# Patient Record
Sex: MALE | Race: WHITE | NOT HISPANIC OR LATINO | Employment: FULL TIME | URBAN - METROPOLITAN AREA
[De-identification: names, ages, dates, MRNs, and addresses within clinical notes are randomized per-mention and may not be internally consistent; named-entity substitution may affect disease eponyms.]

---

## 2022-10-20 ENCOUNTER — OFFICE VISIT (OUTPATIENT)
Dept: URGENT CARE | Facility: CLINIC | Age: 22
End: 2022-10-20
Payer: COMMERCIAL

## 2022-10-20 VITALS
TEMPERATURE: 98.2 F | HEART RATE: 86 BPM | RESPIRATION RATE: 20 BRPM | DIASTOLIC BLOOD PRESSURE: 80 MMHG | HEIGHT: 77 IN | WEIGHT: 256 LBS | SYSTOLIC BLOOD PRESSURE: 132 MMHG | BODY MASS INDEX: 30.23 KG/M2 | OXYGEN SATURATION: 98 %

## 2022-10-20 DIAGNOSIS — R10.13 EPIGASTRIC PAIN: Primary | ICD-10-CM

## 2022-10-20 DIAGNOSIS — R19.5 LOOSE STOOLS: ICD-10-CM

## 2022-10-20 DIAGNOSIS — R11.2 INTRACTABLE VOMITING WITH NAUSEA: ICD-10-CM

## 2022-10-20 PROCEDURE — 99213 OFFICE O/P EST LOW 20 MIN: CPT | Performed by: NURSE PRACTITIONER

## 2022-10-20 RX ORDER — ONDANSETRON 4 MG/1
4 TABLET, FILM COATED ORAL EVERY 8 HOURS PRN
Qty: 20 TABLET | Refills: 0 | Status: SHIPPED | OUTPATIENT
Start: 2022-10-20

## 2022-10-20 RX ORDER — OMEPRAZOLE 40 MG/1
40 CAPSULE, DELAYED RELEASE ORAL DAILY
Qty: 15 CAPSULE | Refills: 0 | Status: SHIPPED | OUTPATIENT
Start: 2022-10-20 | End: 2022-11-04

## 2022-10-20 NOTE — PATIENT INSTRUCTIONS
You may have H pylori which is a stomach bacteria  You are to take the zofran for nausea and vomiting  You are to take the omeprazole daily 1 hour in the AM prior to eating x 3 weeks    You must follow up with your PCP on 11/3 you need further testing   You are to go to the ED if symptoms worsen

## 2022-10-21 ENCOUNTER — TELEPHONE (OUTPATIENT)
Dept: URGENT CARE | Facility: CLINIC | Age: 22
End: 2022-10-21

## 2022-10-21 NOTE — TELEPHONE ENCOUNTER
220pm 10/21/2022  Pt called and asked to speak with NP as he had a few questions  NP with patients to staff relayed message  Pt was concerned as he had worsening abdominal pain, dark stools and green urine - pt was instructed to go to ED for further evaluation - this was relayed to pt by a staff member

## 2022-11-17 ENCOUNTER — OFFICE VISIT (OUTPATIENT)
Dept: FAMILY MEDICINE CLINIC | Facility: CLINIC | Age: 22
End: 2022-11-17

## 2022-11-17 ENCOUNTER — CONSULT (OUTPATIENT)
Dept: GASTROENTEROLOGY | Facility: CLINIC | Age: 22
End: 2022-11-17

## 2022-11-17 ENCOUNTER — APPOINTMENT (OUTPATIENT)
Dept: LAB | Facility: CLINIC | Age: 22
End: 2022-11-17

## 2022-11-17 VITALS
WEIGHT: 254 LBS | BODY MASS INDEX: 29.99 KG/M2 | TEMPERATURE: 98 F | SYSTOLIC BLOOD PRESSURE: 136 MMHG | OXYGEN SATURATION: 98 % | DIASTOLIC BLOOD PRESSURE: 86 MMHG | HEIGHT: 77 IN | HEART RATE: 80 BPM

## 2022-11-17 VITALS
BODY MASS INDEX: 30.04 KG/M2 | TEMPERATURE: 98 F | WEIGHT: 254.4 LBS | HEIGHT: 77 IN | SYSTOLIC BLOOD PRESSURE: 136 MMHG | DIASTOLIC BLOOD PRESSURE: 86 MMHG | HEART RATE: 80 BPM | OXYGEN SATURATION: 98 %

## 2022-11-17 DIAGNOSIS — R19.5 LOOSE STOOLS: ICD-10-CM

## 2022-11-17 DIAGNOSIS — K29.00 ACUTE GASTRITIS WITHOUT HEMORRHAGE, UNSPECIFIED GASTRITIS TYPE: Primary | ICD-10-CM

## 2022-11-17 DIAGNOSIS — R19.4 CHANGE IN BOWEL HABITS: ICD-10-CM

## 2022-11-17 DIAGNOSIS — R11.2 NAUSEA AND VOMITING, UNSPECIFIED VOMITING TYPE: ICD-10-CM

## 2022-11-17 DIAGNOSIS — R11.2 INTRACTABLE VOMITING WITH NAUSEA: ICD-10-CM

## 2022-11-17 DIAGNOSIS — R10.13 EPIGASTRIC PAIN: ICD-10-CM

## 2022-11-17 DIAGNOSIS — R11.2 NAUSEA AND VOMITING, UNSPECIFIED VOMITING TYPE: Primary | ICD-10-CM

## 2022-11-17 LAB
ALBUMIN SERPL BCP-MCNC: 4.1 G/DL (ref 3.5–5)
ALP SERPL-CCNC: 79 U/L (ref 46–116)
ALT SERPL W P-5'-P-CCNC: 41 U/L (ref 12–78)
ANION GAP SERPL CALCULATED.3IONS-SCNC: 3 MMOL/L (ref 4–13)
AST SERPL W P-5'-P-CCNC: 26 U/L (ref 5–45)
BASOPHILS # BLD AUTO: 0.08 THOUSANDS/ÂΜL (ref 0–0.1)
BASOPHILS NFR BLD AUTO: 1 % (ref 0–1)
BILIRUB SERPL-MCNC: 0.47 MG/DL (ref 0.2–1)
BUN SERPL-MCNC: 14 MG/DL (ref 5–25)
CALCIUM SERPL-MCNC: 9.6 MG/DL (ref 8.3–10.1)
CHLORIDE SERPL-SCNC: 106 MMOL/L (ref 96–108)
CO2 SERPL-SCNC: 27 MMOL/L (ref 21–32)
CREAT SERPL-MCNC: 0.93 MG/DL (ref 0.6–1.3)
CRP SERPL QL: <3 MG/L
EOSINOPHIL # BLD AUTO: 0.13 THOUSAND/ÂΜL (ref 0–0.61)
EOSINOPHIL NFR BLD AUTO: 2 % (ref 0–6)
ERYTHROCYTE [DISTWIDTH] IN BLOOD BY AUTOMATED COUNT: 13.5 % (ref 11.6–15.1)
GFR SERPL CREATININE-BSD FRML MDRD: 117 ML/MIN/1.73SQ M
GLUCOSE P FAST SERPL-MCNC: 106 MG/DL (ref 65–99)
HCT VFR BLD AUTO: 46.8 % (ref 36.5–49.3)
HGB BLD-MCNC: 14.8 G/DL (ref 12–17)
IGA SERPL-MCNC: 190 MG/DL (ref 70–400)
IMM GRANULOCYTES # BLD AUTO: 0.03 THOUSAND/UL (ref 0–0.2)
IMM GRANULOCYTES NFR BLD AUTO: 0 % (ref 0–2)
LIPASE SERPL-CCNC: 44 U/L (ref 73–393)
LYMPHOCYTES # BLD AUTO: 2.3 THOUSANDS/ÂΜL (ref 0.6–4.47)
LYMPHOCYTES NFR BLD AUTO: 31 % (ref 14–44)
MCH RBC QN AUTO: 26.9 PG (ref 26.8–34.3)
MCHC RBC AUTO-ENTMCNC: 31.6 G/DL (ref 31.4–37.4)
MCV RBC AUTO: 85 FL (ref 82–98)
MONOCYTES # BLD AUTO: 0.64 THOUSAND/ÂΜL (ref 0.17–1.22)
MONOCYTES NFR BLD AUTO: 9 % (ref 4–12)
NEUTROPHILS # BLD AUTO: 4.15 THOUSANDS/ÂΜL (ref 1.85–7.62)
NEUTS SEG NFR BLD AUTO: 57 % (ref 43–75)
NRBC BLD AUTO-RTO: 0 /100 WBCS
PLATELET # BLD AUTO: 255 THOUSANDS/UL (ref 149–390)
PMV BLD AUTO: 11.8 FL (ref 8.9–12.7)
POTASSIUM SERPL-SCNC: 4.2 MMOL/L (ref 3.5–5.3)
PROT SERPL-MCNC: 8 G/DL (ref 6.4–8.4)
RBC # BLD AUTO: 5.5 MILLION/UL (ref 3.88–5.62)
SODIUM SERPL-SCNC: 136 MMOL/L (ref 135–147)
TSH SERPL DL<=0.05 MIU/L-ACNC: 1.1 UIU/ML (ref 0.45–4.5)
WBC # BLD AUTO: 7.33 THOUSAND/UL (ref 4.31–10.16)

## 2022-11-17 RX ORDER — FAMOTIDINE 20 MG/1
20 TABLET, FILM COATED ORAL 2 TIMES DAILY
Qty: 90 TABLET | Refills: 3 | Status: SHIPPED | OUTPATIENT
Start: 2022-11-17

## 2022-11-17 RX ORDER — OMEPRAZOLE 40 MG/1
40 CAPSULE, DELAYED RELEASE ORAL DAILY
Qty: 90 CAPSULE | Refills: 3 | Status: SHIPPED | OUTPATIENT
Start: 2022-11-17 | End: 2022-11-22

## 2022-11-17 RX ORDER — ONDANSETRON 4 MG/1
4 TABLET, FILM COATED ORAL EVERY 8 HOURS PRN
Qty: 20 TABLET | Refills: 0 | Status: SHIPPED | OUTPATIENT
Start: 2022-11-17

## 2022-11-17 NOTE — PROGRESS NOTES
[facility]        NAME: Qi Tavares is a 23 y o  male  : 2000    MRN: [id number]  DATE: 2022  TIME: 2:16 PM    Assessment and Plan   Acute gastritis without hemorrhage, unspecified gastritis type [K29 00]  1  Acute gastritis without hemorrhage, unspecified gastritis type  omeprazole (PriLOSEC) 40 MG capsule    famotidine (PEPCID) 20 mg tablet    Ambulatory Referral to Gastroenterology      2  Intractable vomiting with nausea  omeprazole (PriLOSEC) 40 MG capsule    famotidine (PEPCID) 20 mg tablet    Ambulatory Referral to Gastroenterology      3  Loose stools  omeprazole (PriLOSEC) 40 MG capsule    famotidine (PEPCID) 20 mg tablet    Stool culture    Clostridium difficile toxin by PCR    Ambulatory Referral to Gastroenterology      4  Epigastric pain  omeprazole (PriLOSEC) 40 MG capsule    famotidine (PEPCID) 20 mg tablet    Stool culture    Clostridium difficile toxin by PCR    Ambulatory Referral to Gastroenterology        1  Intractable vomiting with nausea  Stopped Omeprazole and Sulcrafate, currently not taking any medication as ordered by GI  Agreeable to restart Omeprazole and add Pepcid  EGD revealed gastritis, no evidence of H  Pylori  Stop vaping, watch diet, avoid aggravating foods/drinks  Please follow up with GI for further management  - omeprazole (PriLOSEC) 40 MG capsule; Take 1 capsule (40 mg total) by mouth daily  Dispense: 90 capsule; Refill: 3  - famotidine (PEPCID) 20 mg tablet; Take 1 tablet (20 mg total) by mouth 2 (two) times a day  Dispense: 90 tablet; Refill: 3  - Ambulatory Referral to Gastroenterology; Future    2  Loose stools  Sulcrafate was stopped due to a potential allergic reaction  Will check stool cultures  - omeprazole (PriLOSEC) 40 MG capsule; Take 1 capsule (40 mg total) by mouth daily  Dispense: 90 capsule; Refill: 3  - famotidine (PEPCID) 20 mg tablet;  Take 1 tablet (20 mg total) by mouth 2 (two) times a day  Dispense: 90 tablet; Refill: 3  - Stool culture; Future  - Clostridium difficile toxin by PCR; Future  - Ambulatory Referral to Gastroenterology; Future    3  Epigastric pain  Stopped Omeprazole and Sulcrafate, currently not taking any medication as ordered by GI  Agreeable to restart Omeprazole and add Pepcid  EGD revealed gastritis, no evidence of H  Pylori  Stop vaping, watch diet, avoid aggravating foods/drinks  Please follow up with GI for further management  - omeprazole (PriLOSEC) 40 MG capsule; Take 1 capsule (40 mg total) by mouth daily  Dispense: 90 capsule; Refill: 3  - famotidine (PEPCID) 20 mg tablet; Take 1 tablet (20 mg total) by mouth 2 (two) times a day  Dispense: 90 tablet; Refill: 3  - Stool culture; Future  - Clostridium difficile toxin by PCR; Future  - Ambulatory Referral to Gastroenterology; Future    4  Acute gastritis without hemorrhage, unspecified gastritis type  Stopped Omeprazole and Sulcrafate, currently not taking any medication as ordered by GI  Agreeable to restart Omeprazole and add Pepcid  EGD revealed gastritis, no evidence of H  Pylori  Stop vaping, watch diet, avoid aggravating foods/drinks  Please follow up with GI for further management  - omeprazole (PriLOSEC) 40 MG capsule; Take 1 capsule (40 mg total) by mouth daily  Dispense: 90 capsule; Refill: 3  - famotidine (PEPCID) 20 mg tablet; Take 1 tablet (20 mg total) by mouth 2 (two) times a day  Dispense: 90 tablet; Refill: 3  - Ambulatory Referral to Gastroenterology; Future    BMI Counseling: Body mass index is 30 12 kg/m²  The BMI is above normal  Nutrition recommendations include encouraging healthy choices of fruits and vegetables, limiting drinks that contain sugar, moderation in carbohydrate intake, reducing intake of saturated and trans fat and reducing intake of cholesterol  Exercise recommendations include exercising 3-5 times per week  Rationale for BMI follow-up plan is due to patient being overweight or obese       Depression Screening and Follow-up Plan: Patient was screened for depression during today's encounter  They screened negative with a PHQ-2 score of 0  Patient Instructions     Patient Instructions     Please restart Omeprazole and begin Pepcid  Complete stool cultures as discussed  Call and schedule appointment with GI for further evaluation  Avoid triggering foods/drinks, and work on smoking cessation (vaping)    Wellness Visit for Adults   AMBULATORY CARE:   A wellness visit  is when you see your healthcare provider to get screened for health problems  Your healthcare provider will also give you advice on how to stay healthy  Write down your questions so you remember to ask them  Ask your healthcare provider how often you should have a wellness visit  What happens at a wellness visit:  Your healthcare provider will ask about your health, and your family history of health problems  This includes high blood pressure, heart disease, and cancer  He or she will ask if you have symptoms that concern you, if you smoke, and about your mood  You may also be asked about your intake of medicines, supplements, food, and alcohol  Any of the following may be done:  · Your weight  will be checked  Your height may also be checked so your body mass index (BMI) can be calculated  Your BMI shows if you are at a healthy weight  · Your blood pressure  and heart rate will be checked  Your temperature may also be checked  · Blood and urine tests  may be done  Blood tests may be done to check your cholesterol levels  Abnormal cholesterol levels increase your risk for heart disease and stroke  You may also need a blood or urine test to check for diabetes if you are at increased risk  Urine tests may be done to look for signs of an infection or kidney disease  · A physical exam  includes checking your heartbeat and lungs with a stethoscope  Your healthcare provider may also check your skin to look for sun damage      · Screening tests may be recommended  A screening test is done to check for diseases that may not cause symptoms  The screening tests you may need depend on your age, gender, family history, and lifestyle habits  For example, colorectal screening may be recommended if you are 48years old or older  Screening tests you need if you are a woman:   · A Pap smear  is used to screen for cervical cancer  Pap smears are usually done every 3 to 5 years depending on your age  You may need them more often if you have had abnormal Pap smear test results in the past  Ask your healthcare provider how often you should have a Pap smear  · A mammogram  is an x-ray of your breasts to screen for breast cancer  Experts recommend mammograms every 2 years starting at age 48 years  You may need a mammogram at age 52 years or younger if you have an increased risk for breast cancer  Talk to your healthcare provider about when you should start having mammograms and how often you need them  Vaccines you may need:   · Get an influenza vaccine  every year  The influenza vaccine protects you from the flu  Several types of viruses cause the flu  The viruses change over time, so new vaccines are made each year  · Get a tetanus-diphtheria (Td) booster vaccine  every 10 years  This vaccine protects you against tetanus and diphtheria  Tetanus is a severe infection that may cause painful muscle spasms and lockjaw  Diphtheria is a severe bacterial infection that causes a thick covering in the back of your mouth and throat  · Get a human papillomavirus (HPV) vaccine  if you are female and aged 23 to 32 or male 23 to 24 and never received it  This vaccine protects you from HPV infection  HPV is the most common infection spread by sexual contact  HPV may also cause vaginal, penile, and anal cancers  · Get a pneumococcal vaccine  if you are aged 72 years or older  The pneumococcal vaccine is an injection given to protect you from pneumococcal disease  Pneumococcal disease is an infection caused by pneumococcal bacteria  The infection may cause pneumonia, meningitis, or an ear infection  · Get a shingles vaccine  if you are 60 or older, even if you have had shingles before  The shingles vaccine is an injection to protect you from the varicella-zoster virus  This is the same virus that causes chickenpox  Shingles is a painful rash that develops in people who had chickenpox or have been exposed to the virus  How to eat healthy:  My Plate is a model for planning healthy meals  It shows the types and amounts of foods that should go on your plate  Fruits and vegetables make up about half of your plate, and grains and protein make up the other half  A serving of dairy is included on the side of your plate  The amount of calories and serving sizes you need depends on your age, gender, weight, and height  Examples of healthy foods are listed below:  · Eat a variety of vegetables  such as dark green, red, and orange vegetables  You can also include canned vegetables low in sodium (salt) and frozen vegetables without added butter or sauces  · Eat a variety of fresh fruits , canned fruit in 100% juice, frozen fruit, and dried fruit  · Include whole grains  At least half of the grains you eat should be whole grains  Examples include whole-wheat bread, wheat pasta, brown rice, and whole-grain cereals such as oatmeal     · Eat a variety of protein foods such as seafood (fish and shellfish), lean meat, and poultry without skin (turkey and chicken)  Examples of lean meats include pork leg, shoulder, or tenderloin, and beef round, sirloin, tenderloin, and extra lean ground beef  Other protein foods include eggs and egg substitutes, beans, peas, soy products, nuts, and seeds  · Choose low-fat dairy products such as skim or 1% milk or low-fat yogurt, cheese, and cottage cheese  · Limit unhealthy fats  such as butter, hard margarine, and shortening         Exercise: Exercise at least 30 minutes per day on most days of the week  Some examples of exercise include walking, biking, dancing, and swimming  You can also fit in more physical activity by taking the stairs instead of the elevator or parking farther away from stores  Include muscle strengthening activities 2 days each week  Regular exercise provides many health benefits  It helps you manage your weight, and decreases your risk for type 2 diabetes, heart disease, stroke, and high blood pressure  Exercise can also help improve your mood  Ask your healthcare provider about the best exercise plan for you  General health and safety guidelines:   · Do not smoke  Nicotine and other chemicals in cigarettes and cigars can cause lung damage  Ask your healthcare provider for information if you currently smoke and need help to quit  E-cigarettes or smokeless tobacco still contain nicotine  Talk to your healthcare provider before you use these products  · Limit alcohol  A drink of alcohol is 12 ounces of beer, 5 ounces of wine, or 1½ ounces of liquor  · Lose weight, if needed  Being overweight increases your risk of certain health conditions  These include heart disease, high blood pressure, type 2 diabetes, and certain types of cancer  · Protect your skin  Do not sunbathe or use tanning beds  Use sunscreen with a SPF 15 or higher  Apply sunscreen at least 15 minutes before you go outside  Reapply sunscreen every 2 hours  Wear protective clothing, hats, and sunglasses when you are outside  · Drive safely  Always wear your seatbelt  Make sure everyone in your car wears a seatbelt  A seatbelt can save your life if you are in an accident  Do not use your cell phone when you are driving  This could distract you and cause an accident  Pull over if you need to make a call or send a text message  · Practice safe sex  Use latex condoms if are sexually active and have more than one partner   Your healthcare provider may recommend screening tests for sexually transmitted infections (STIs)  · Wear helmets, lifejackets, and protective gear  Always wear a helmet when you ride a bike or motorcycle, go skiing, or play sports that could cause a head injury  Wear protective equipment when you play sports  Wear a lifejacket when you are on a boat or doing water sports  © Copyright [employer] 2022 Information is for End User's use only and may not be sold, redistributed or otherwise used for commercial purposes  All illustrations and images included in CareNotes® are the copyrighted property of A D A M , Inc  or [employer]   The above information is an  only  It is not intended as medical advice for individual conditions or treatments  Talk to your doctor, nurse or pharmacist before following any medical regimen to see if it is safe and effective for you  Chief Complaint     Chief Complaint   Patient presents with   • Establish Care   • Annual Exam   • GI Problem     Patient complaints of stomach issues  Going on about a month  Has lost 5 lbs  HIDA scan next week  Has been at ER twice  Has been going to [facility]  Had endoscopy 11/3 and biopsy  History of Present Illness       Here to establish care and discuss abdominal pain/diarrhea/vomitting  Moved form [facility] - was seen regularly by PCP there  Recently having GI concerns - changed his diet, stopped eating late at night, started eating blander foods, rice, chicken, does not drink soda or alcohol  Previously was eating fatty unhealthy foods  Does not take an supplements  Did have EGD completed with biospy which was resulted as "DIAGNOSIS :  A  GASTRIC ANTRUM/BODY (BIOPSIES):  Nonspecific patchy mild chronic gastritis  Histologic features of H  pylori gastritis are not identified "    Reviewed blood work from 10/2022- no significant findings  Discussed results with patient   He ran out of his omeprazole, we will reorder today  Discussed avoiding acidic food, lobito vogel  Was given Sulcrafate - took twice and had facial flushing - called GI doctor, and they told him to stop, did not give him any alternative medication  Pt reports he is still vomiting, threw up his breakfast this morning, having loose dark stools, this morning noticed some redness in his stool, 2-3 times / day, liquid, severe abdominal pain intermittently (pointed to epigastric region)  Refilled Omeprazole, started Pepcid  He did report that GI discussed a colonoscopy and stool samples but he has not heard anymore from them  He would like a second GI opinion  This has been ongoing for 1 month  Wilmer teeth removed in February, broken left ring finger fixed in February  Otherwise, no major problems  Using medical cannabis  Review of Systems   Review of Systems   Constitutional: Negative for activity change, appetite change, chills, fatigue and fever  HENT: Negative for congestion, ear pain, nosebleeds, rhinorrhea and sore throat  Eyes: Negative for photophobia, pain, redness and visual disturbance  Respiratory: Negative for cough, shortness of breath and wheezing  Cardiovascular: Negative  Negative for chest pain  Gastrointestinal: Positive for abdominal pain, diarrhea, nausea and vomiting  Negative for constipation  Endocrine: Negative  Genitourinary: Negative for difficulty urinating, dysuria and flank pain  Musculoskeletal: Negative  Skin: Negative for color change and rash  Neurological: Negative for dizziness, weakness, numbness and headaches  Hematological: Negative for adenopathy  Psychiatric/Behavioral: Negative for agitation and confusion  The patient is not nervous/anxious          PHQ-2/9 Depression Screening    Little interest or pleasure in doing things: 0 - not at all  Feeling down, depressed, or hopeless: 0 - not at all  PHQ-2 Score: 0  PHQ-2 Interpretation: Negative depression screen        Current Medications       Current Outpatient Medications:   •  famotidine (PEPCID) 20 mg tablet, Take 1 tablet (20 mg total) by mouth 2 (two) times a day, Disp: 90 tablet, Rfl: 3  •  omeprazole (PriLOSEC) 40 MG capsule, Take 1 capsule (40 mg total) by mouth daily, Disp: 90 capsule, Rfl: 3  •  ondansetron (ZOFRAN) 4 mg tablet, Take 1 tablet (4 mg total) by mouth every 8 (eight) hours as needed for nausea or vomiting, Disp: 20 tablet, Rfl: 0    Current Allergies     Allergies as of 11/17/2022   • (No Known Allergies)            The following portions of the patient's history were reviewed and updated as appropriate: allergies, current medications, past family history, past medical history, past social history, past surgical history and problem list      No past medical history on file  No past surgical history on file  No family history on file  Medications have been verified  Objective   /86   Pulse 80   Temp 98 °F (36 7 °C)   Ht 6' 5" (1 956 m)   Wt 115 kg (254 lb)   SpO2 98%   BMI 30 12 kg/m²        Physical Exam     Physical Exam  Constitutional:       General: He is not in acute distress  Appearance: Normal appearance  He is well-developed and well-nourished  He is not ill-appearing  HENT:      Head: Normocephalic and atraumatic  Right Ear: Tympanic membrane, ear canal and external ear normal       Left Ear: Tympanic membrane, ear canal and external ear normal       Nose: Nose normal  No rhinorrhea  Mouth/Throat:      Mouth: Oropharynx is clear and moist and mucous membranes are normal       Pharynx: Uvula midline  Eyes:      General: Lids are normal       Extraocular Movements: EOM normal       Conjunctiva/sclera: Conjunctivae normal       Pupils: Pupils are equal, round, and reactive to light  Cardiovascular:      Rate and Rhythm: Normal rate and regular rhythm  Pulses: Intact distal pulses        Heart sounds: Normal heart sounds, S1 normal and S2 normal  No murmur heard     No friction rub  No gallop  Pulmonary:      Effort: Pulmonary effort is normal  No respiratory distress  Breath sounds: Normal breath sounds  No decreased breath sounds or wheezing  Abdominal:      General: Bowel sounds are normal  There is no distension  Palpations: Abdomen is soft  There is no mass  Tenderness: There is generalized abdominal tenderness and tenderness in the epigastric area  There is no guarding or rebound  Negative signs include Rovsing's sign  Hernia: No hernia is present  Musculoskeletal:         General: No tenderness, deformity or edema  Normal range of motion  Skin:     General: Skin is warm  Findings: No erythema or rash  Neurological:      Mental Status: He is alert and oriented to person, place, and time  Comments: Gross Neuro intact  Psychiatric:         Mood and Affect: Mood and affect normal          Behavior: Behavior normal  Behavior is cooperative  Thought Content:  Thought content normal

## 2022-11-17 NOTE — PROGRESS NOTES
[facility]ULT [facility]AL [facility]    NAME: John Washburn  AGE: 23 y o  SEX: male  : 2000     DATE: 2022     Assessment and Plan:     Problem List Items Addressed This Visit    None      Immunizations and preventive care screenings were discussed with patient today  Appropriate education was printed on patient's after visit summary  Counseling:  · {Annual Physical; Counselin}         No follow-ups on file  Chief Complaint:     Chief Complaint   Patient presents with   • Establish Care   • Annual Exam   • GI Problem     Patient complaints of stomach issues  Going on about a month  Has lost 5 lbs  HIDA scan next week  Has been at ER twice  Has been going to [facility]  Had endoscopy 11/3 and biopsy  History of Present Illness:     Adult Annual Physical   Patient here for a comprehensive physical exam  The patient reports {problems:34609}  Diet and Physical Activity  · Diet/Nutrition: {annual physical; diet:}  · Exercise: {annual physical; exercise:2102}  Depression Screening  PHQ-2/9 Depression Screening    Little interest or pleasure in doing things: 0 - not at all  Feeling down, depressed, or hopeless: 0 - not at all  PHQ-2 Score: 0  PHQ-2 Interpretation: Negative depression screen       General Health  · Sleep: {annual physical; sleep:2102}  · Hearing: {annual physical; hearin}  · Vision: {annual physical; vision:}  · Dental: {annual physical; dental:}   Health  · History of STDs? : {yes/no:30763}  Review of Systems:     Review of Systems   Past Medical History:     No past medical history on file  Past Surgical History:     No past surgical history on file     Social History:     Social History     Socioeconomic History   • Marital status: Single     Spouse name: None   • Number of children: None   • Years of education: None   • Highest education level: None   Occupational History   • None   Tobacco Use   • Smoking status: Never   • Smokeless tobacco: Never   Vaping Use   • Vaping Use: Some days   Substance and Sexual Activity   • Alcohol use: None   • Drug use: None   • Sexual activity: None   Other Topics Concern   • None   Social History Narrative   • None     Social Determinants of Health     Financial Resource Strain: Not on file   Food Insecurity: Not on file   Transportation Needs: Not on file   Physical Activity: Not on file   Stress: Not on file   Social Connections: Not on file   Intimate Partner Violence: Not on file   Housing Stability: Not on file      Family History:     No family history on file  Current Medications:     Current Outpatient Medications   Medication Sig Dispense Refill   • ondansetron (ZOFRAN) 4 mg tablet Take 1 tablet (4 mg total) by mouth every 8 (eight) hours as needed for nausea or vomiting 20 tablet 0   • omeprazole (PriLOSEC) 40 MG capsule Take 1 capsule (40 mg total) by mouth daily for 15 days 15 capsule 0     No current facility-administered medications for this visit        Allergies:     No Known Allergies   Physical Exam:     /86   Pulse 80   Temp 98 °F (36 7 °C)   Ht 6' 5" (1 956 m)   Wt 115 kg (254 lb)   SpO2 98%   BMI 30 12 kg/m²     Physical Exam     CHRISTIAN Oakes   [facility]

## 2022-11-17 NOTE — PROGRESS NOTES
Paula [facility] - Outpatient Consultation  Kaylen Woods 23 y o  male MRN: [id number]  Encounter: [id number]    ASSESSMENT AND PLAN:      1  Nausea and vomiting, unspecified vomiting type  2  Epigastric pain    Patient notes approximately 1 month of nausea, nonbloody emesis, retching, and upper abdominal pain  Per patient his symptoms are progressing in frequency and severity, and workup thus far has been somewhat unrevealing  He shares he had an upper endoscopy and right upper quadrant ultrasound completed, and he is currently following with a local GI group  While we do not have all of his records, pathology from upper endoscopy demonstrates some gastritis  There is no biliary pathology from ultrasound a few weeks prior  He shares that his GI team has ordered a HIDA scan for additional evaluation  Ddx includes gastritis, GERD, functional dyspepsia gastroparesis, biliary dyskinesia, cyclical vomiting/cannabis hyperemesis, other etiology  Recommend continuing on PPI and H2B now  Okay for p r n  use of antiemetics  Recommend anti-reflux diet and lifestyle precautions  Repeat lipase and LFTs now given tenderness on abdominal examination  Given relatively unremarkable upper endoscopy, right upper quadrant ultrasound, and HIDA scan planned, will await results prior to proceeding with additional investigation  If the workup is unremarkable and symptoms persist, would consider gastric emptying study and possibly more advanced imaging in the future  - Moreover, if workup is completely unremarkable and symptoms persist, consider 8-12 weeks cessation of cannabis intake  - CBC and differential; Future  - Comprehensive metabolic panel; Future  - Lipase; Future  - C-reactive protein; Future  - Calprotectin,Fecal; Future  - IgA; Future  - Tissue transglutaminase, IgA; Future  - TSH w/Reflex; Future  - Giardia antigen; Future  - Stool Enteric Bacterial Panel by PCR;  Future  - Ova and parasite examination; Future  - ondansetron (ZOFRAN) 4 mg tablet; Take 1 tablet (4 mg total) by mouth every 8 (eight) hours as needed for nausea or vomiting  Dispense: 20 tablet; Refill: 0    3  Change in bowel habits    Patient complains of change in bowel habits to our urgent loose and dark stool  Uncertain as to whether this represents any true melena given his endorsement of intermittent Pepto-Bismol use as well  Moreover, his upper endoscopy was relatively unremarkable per patient report and his hemoglobin has been stable  Recommend targeted stool studies given acute change in bowel habits  We will repeat hemoglobin at this time to ensure no interval development of anemia  If dark appearing stools persist despite workup as above, would then consider colonoscopy to evaluate for right-sided source of bleeding, though index of suspicion as this as an etiology at this point in time is low  Continue on a bland low residue diet at this time  Avoid antidiarrheals until stool testing returns     - CBC and differential; Future  - Comprehensive metabolic panel; Future  - Lipase; Future  - C-reactive protein; Future  - Calprotectin,Fecal; Future  - IgA; Future  - Tissue transglutaminase, IgA; Future  - TSH w/Reflex; Future  - Giardia antigen; Future  - Stool Enteric Bacterial Panel by PCR; Future  - Ova and parasite examination; Future    Discussed with patient that given he has already established with local gastroenterology group, and they are continuing investigative workup for his symptoms, would recommend he either continues to follow with them or transfer his care to ensure continuity of care and best patient outcome possible  He should also follow-up with his PCP regarding complaints of dysuria  He can certainly call us if he has any other questions or concerns     ______________________________________________________________________    HPI: Patient is a 23 y o  male who presents today for a consultation regarding abdominal pain, nausea, dry heaving  No significant past medical history  Patient is new to our clinic  He presently follows with [facility]  He shares that he developed acute onset of abdominal pain, nausea, nonbloody emesis and dry heaving for approximately the past month  Describes a “pit” in the epigastric region and right side of his abdomen  Notes abdominal pain wakes him up from sleep at night  Endorses symptoms of early satiety, states he has lost 5 lb since onset of symptoms  No dysphagia or odynophagia  Does have some intermittent heartburn and reflux, though not predominant symptom  Is unaware of any obvious precipitant or inciting event, no febrile illness, no recent abx, no new meds or supplements, no change in anxiety or stressors, no significant dietary change, no recent travel  Did move into a new home in 08/2022  He does use cannabis, which he states improves his symptoms  No regular NSAID use or EtOH use  Onset of symptoms, spicy foods, sour foods, salty foods seem to worsen his abdominal discomfort  He is also complaining of lower abdominal cramping and a change in his bowel habits  His bowels used to be solid and dark brown, now for the past month he describes bowel movements as “semi-solid to liquid” stools, 2-3 times daily, which can appear quite dark and almost black in color  He does endorse intermittent Pepto-Bismol use, no iron use  No BRBPR  No nocturnal BMs  Has been taking omeprazole, famotidine, and having a bland diet since onset of symptoms, though symptoms persist   Uses nausea pills as needed  Patient denies any new rashes, diffuse mouth ulcerations, joint erythema or swelling, recurrent eye infections  Patient denies family history of inflammatory bowel disease or colon cancer      10/2022: RUQ US: no gallstones, normal CBD, no pericholecystic fluid   10/2022: Hb 14 8, Hct 44 3, Plt 229, MCV 82, BUN 8, Cr 0 79, ALP 71, t bili 0 6, AST 22, ALT 27, total protein 8 0     Endoscopic history:   EGD: 11/2022: report not available at time of dictation   A  Gastric antrum/body (biopsies):Nonspecific patchy mild chronic gastritis  Histologic features of H  pylori gastritis are not identified  Colon: none     Review of Systems   Constitutional: Positive for appetite change, fatigue and unexpected weight change (5 lb weight loss)  Respiratory: Negative for cough and shortness of breath  Cardiovascular: Negative for chest pain and palpitations  Gastrointestinal: Positive for abdominal pain, diarrhea, nausea and vomiting  Negative for anal bleeding and blood in stool  Genitourinary: Positive for dysuria  Musculoskeletal: Negative for arthralgias, joint swelling and myalgias  Skin: Negative for rash  Neurological: Negative for seizures and syncope  Otherwise Per HPI    Historical Information   History reviewed  No pertinent past medical history  History reviewed  No pertinent surgical history  Social History   Social History     Substance and Sexual Activity   Alcohol Use None     Social History     Substance and Sexual Activity   Drug Use Not on file     Social History     Tobacco Use   Smoking Status Never   Smokeless Tobacco Never     History reviewed  No pertinent family history  Meds/Allergies       Current Outpatient Medications:   •  famotidine (PEPCID) 20 mg tablet  •  omeprazole (PriLOSEC) 40 MG capsule  •  ondansetron (ZOFRAN) 4 mg tablet    No Known Allergies    Objective     Blood pressure 136/86, pulse 80, temperature 98 °F (36 7 °C), temperature source Temporal, height 6' 5" (1 956 m), weight 115 kg (254 lb 6 4 oz), SpO2 98 %  Body mass index is 30 17 kg/m²  Physical Exam  Vitals and nursing note reviewed  Constitutional:       Appearance: Normal appearance  HENT:      Head: Normocephalic and atraumatic  Eyes:      General: No scleral icterus       Conjunctiva/sclera: Conjunctivae normal  Cardiovascular:      Rate and Rhythm: Normal rate  Pulses: Normal pulses  Heart sounds: Normal heart sounds  Pulmonary:      Effort: Pulmonary effort is normal  No respiratory distress  Breath sounds: Normal breath sounds  Abdominal:      General: Bowel sounds are normal  There is no distension  Tenderness: There is abdominal tenderness  There is guarding (active)  There is no rebound  Negative signs include Ortega's sign  Skin:     General: Skin is warm and dry  Coloration: Skin is not jaundiced  Neurological:      General: No focal deficit present  Mental Status: He is alert and oriented to person, place, and time  Psychiatric:         Mood and Affect: Mood normal          Behavior: Behavior normal         Lab Results:   No visits with results within 1 Day(s) from this visit  Latest known visit with results is:   No results found for any previous visit  Radiology Results:   No results found  Jessica Soto PA-C    **Please note:  Dictation voice to text software may have been used in the creation of this record  Occasional wrong word or “sound alike” substitutions may have occurred due to the inherent limitations of voice recognition software  Read the chart carefully and recognize, using context, where substitutions have occurred  **

## 2022-11-17 NOTE — PATIENT INSTRUCTIONS
Given you are having persistent symptoms, we are going to expand upon the workup for acute nausea, abdominal pain, and change in bowel habits  - we are going to check more blood work and stool tests for causes of the above  - continue with the omeprazole 40 mg 1st thing in the morning before breakfast, and start on the famotidine/Pepcid 40 mg in the evening  I think you were prescribed 20 mg tablets to take 2 of these nightly  Okay for as needed Zofran for nausea  - if you do not find an appreciable improvement with this medication regimen call and we can try stopping out the omeprazole for a different member of the drug class  - recommend small frequent meals and a bland diet at this time    - avoid regular use of Pepto-Bismol and avoid antidiarrheals until you get all the stool tests back  - your scheduled for a HIDA scan to evaluate for gallbladder dysfunction, if your symptoms persist despite the above workup in medications, would recommend considering a gastric emptying study, more advanced imaging of the abdomen/pelvis, and considering evaluation with colonoscopy

## 2022-11-17 NOTE — PATIENT INSTRUCTIONS
Please restart Omeprazole and begin Pepcid  Complete stool cultures as discussed  Call and schedule appointment with GI for further evaluation  Avoid triggering foods/drinks, and work on smoking cessation (vaping)    Wellness Visit for Adults   AMBULATORY CARE:   A wellness visit  is when you see your healthcare provider to get screened for health problems  Your healthcare provider will also give you advice on how to stay healthy  Write down your questions so you remember to ask them  Ask your healthcare provider how often you should have a wellness visit  What happens at a wellness visit:  Your healthcare provider will ask about your health, and your family history of health problems  This includes high blood pressure, heart disease, and cancer  He or she will ask if you have symptoms that concern you, if you smoke, and about your mood  You may also be asked about your intake of medicines, supplements, food, and alcohol  Any of the following may be done: Your weight  will be checked  Your height may also be checked so your body mass index (BMI) can be calculated  Your BMI shows if you are at a healthy weight  Your blood pressure  and heart rate will be checked  Your temperature may also be checked  Blood and urine tests  may be done  Blood tests may be done to check your cholesterol levels  Abnormal cholesterol levels increase your risk for heart disease and stroke  You may also need a blood or urine test to check for diabetes if you are at increased risk  Urine tests may be done to look for signs of an infection or kidney disease  A physical exam  includes checking your heartbeat and lungs with a stethoscope  Your healthcare provider may also check your skin to look for sun damage  Screening tests  may be recommended  A screening test is done to check for diseases that may not cause symptoms  The screening tests you may need depend on your age, gender, family history, and lifestyle habits   For example, colorectal screening may be recommended if you are 48years old or older  Screening tests you need if you are a woman:   A Pap smear  is used to screen for cervical cancer  Pap smears are usually done every 3 to 5 years depending on your age  You may need them more often if you have had abnormal Pap smear test results in the past  Ask your healthcare provider how often you should have a Pap smear  A mammogram  is an x-ray of your breasts to screen for breast cancer  Experts recommend mammograms every 2 years starting at age 48 years  You may need a mammogram at age 52 years or younger if you have an increased risk for breast cancer  Talk to your healthcare provider about when you should start having mammograms and how often you need them  Vaccines you may need:   Get an influenza vaccine  every year  The influenza vaccine protects you from the flu  Several types of viruses cause the flu  The viruses change over time, so new vaccines are made each year  Get a tetanus-diphtheria (Td) booster vaccine  every 10 years  This vaccine protects you against tetanus and diphtheria  Tetanus is a severe infection that may cause painful muscle spasms and lockjaw  Diphtheria is a severe bacterial infection that causes a thick covering in the back of your mouth and throat  Get a human papillomavirus (HPV) vaccine  if you are female and aged 23 to 32 or male 23 to 24 and never received it  This vaccine protects you from HPV infection  HPV is the most common infection spread by sexual contact  HPV may also cause vaginal, penile, and anal cancers  Get a pneumococcal vaccine  if you are aged 72 years or older  The pneumococcal vaccine is an injection given to protect you from pneumococcal disease  Pneumococcal disease is an infection caused by pneumococcal bacteria  The infection may cause pneumonia, meningitis, or an ear infection      Get a shingles vaccine  if you are 60 or older, even if you have had shingles before  The shingles vaccine is an injection to protect you from the varicella-zoster virus  This is the same virus that causes chickenpox  Shingles is a painful rash that develops in people who had chickenpox or have been exposed to the virus  How to eat healthy:  My Plate is a model for planning healthy meals  It shows the types and amounts of foods that should go on your plate  Fruits and vegetables make up about half of your plate, and grains and protein make up the other half  A serving of dairy is included on the side of your plate  The amount of calories and serving sizes you need depends on your age, gender, weight, and height  Examples of healthy foods are listed below:  Eat a variety of vegetables  such as dark green, red, and orange vegetables  You can also include canned vegetables low in sodium (salt) and frozen vegetables without added butter or sauces  Eat a variety of fresh fruits , canned fruit in 100% juice, frozen fruit, and dried fruit  Include whole grains  At least half of the grains you eat should be whole grains  Examples include whole-wheat bread, wheat pasta, brown rice, and whole-grain cereals such as oatmeal     Eat a variety of protein foods such as seafood (fish and shellfish), lean meat, and poultry without skin (turkey and chicken)  Examples of lean meats include pork leg, shoulder, or tenderloin, and beef round, sirloin, tenderloin, and extra lean ground beef  Other protein foods include eggs and egg substitutes, beans, peas, soy products, nuts, and seeds  Choose low-fat dairy products such as skim or 1% milk or low-fat yogurt, cheese, and cottage cheese  Limit unhealthy fats  such as butter, hard margarine, and shortening  Exercise:  Exercise at least 30 minutes per day on most days of the week  Some examples of exercise include walking, biking, dancing, and swimming   You can also fit in more physical activity by taking the stairs instead of the elevator or parking farther away from stores  Include muscle strengthening activities 2 days each week  Regular exercise provides many health benefits  It helps you manage your weight, and decreases your risk for type 2 diabetes, heart disease, stroke, and high blood pressure  Exercise can also help improve your mood  Ask your healthcare provider about the best exercise plan for you  General health and safety guidelines:   Do not smoke  Nicotine and other chemicals in cigarettes and cigars can cause lung damage  Ask your healthcare provider for information if you currently smoke and need help to quit  E-cigarettes or smokeless tobacco still contain nicotine  Talk to your healthcare provider before you use these products  Limit alcohol  A drink of alcohol is 12 ounces of beer, 5 ounces of wine, or 1½ ounces of liquor  Lose weight, if needed  Being overweight increases your risk of certain health conditions  These include heart disease, high blood pressure, type 2 diabetes, and certain types of cancer  Protect your skin  Do not sunbathe or use tanning beds  Use sunscreen with a SPF 15 or higher  Apply sunscreen at least 15 minutes before you go outside  Reapply sunscreen every 2 hours  Wear protective clothing, hats, and sunglasses when you are outside  Drive safely  Always wear your seatbelt  Make sure everyone in your car wears a seatbelt  A seatbelt can save your life if you are in an accident  Do not use your cell phone when you are driving  This could distract you and cause an accident  Pull over if you need to make a call or send a text message  Practice safe sex  Use latex condoms if are sexually active and have more than one partner  Your healthcare provider may recommend screening tests for sexually transmitted infections (STIs)  Wear helmets, lifejackets, and protective gear    Always wear a helmet when you ride a bike or motorcycle, go skiing, or play sports that could cause a head injury  Wear protective equipment when you play sports  Wear a lifejacket when you are on a boat or doing water sports  © Copyright [employer] 2022 Information is for End User's use only and may not be sold, redistributed or otherwise used for commercial purposes  All illustrations and images included in CareNotes® are the copyrighted property of [employer]  or [facility] Ted Macias   The above information is an  only  It is not intended as medical advice for individual conditions or treatments  Talk to your doctor, nurse or pharmacist before following any medical regimen to see if it is safe and effective for you

## 2022-11-18 LAB — TTG IGA SER-ACNC: <2 U/ML (ref 0–3)

## 2022-11-21 ENCOUNTER — APPOINTMENT (OUTPATIENT)
Dept: LAB | Facility: CLINIC | Age: 22
End: 2022-11-21

## 2022-11-21 DIAGNOSIS — R11.2 NAUSEA AND VOMITING, UNSPECIFIED VOMITING TYPE: ICD-10-CM

## 2022-11-21 DIAGNOSIS — R19.5 LOOSE STOOLS: ICD-10-CM

## 2022-11-21 DIAGNOSIS — R19.4 CHANGE IN BOWEL HABITS: ICD-10-CM

## 2022-11-21 DIAGNOSIS — R10.13 EPIGASTRIC PAIN: ICD-10-CM

## 2022-11-22 ENCOUNTER — TELEPHONE (OUTPATIENT)
Dept: FAMILY MEDICINE CLINIC | Facility: CLINIC | Age: 22
End: 2022-11-22

## 2022-11-22 LAB
CAMPYLOBACTER DNA SPEC NAA+PROBE: NORMAL
G LAMBLIA AG STL QL IA: NEGATIVE
SALMONELLA DNA SPEC QL NAA+PROBE: NORMAL
SHIGA TOXIN STX GENE SPEC NAA+PROBE: NORMAL
SHIGELLA DNA SPEC QL NAA+PROBE: NORMAL

## 2022-11-22 NOTE — TELEPHONE ENCOUNTER
Pt called and was in last week for a visit   He is asking if [employer] would be able to fill out work papers, since he is still off of work    Please advise    Phone [phone]

## 2022-11-23 ENCOUNTER — TELEPHONE (OUTPATIENT)
Dept: OTHER | Facility: OTHER | Age: 22
End: 2022-11-23

## 2022-11-23 LAB — CALPROTECTIN STL-MCNT: 33 UG/G (ref 0–120)

## 2022-11-23 NOTE — TELEPHONE ENCOUNTER
Patient is calling today to Follow up on  Accident and Sickness Paperwork for his Employer  Please call patient to discuss

## 2022-11-25 ENCOUNTER — TELEPHONE (OUTPATIENT)
Dept: FAMILY MEDICINE CLINIC | Facility: CLINIC | Age: 22
End: 2022-11-25

## 2022-11-25 NOTE — TELEPHONE ENCOUNTER
Radha Pinto 14 minutes ago (11:40 AM)     [employer] leave of absents form  Scanned in media  Placed in Jasson folder     Please advise               Note

## 2022-11-25 NOTE — TELEPHONE ENCOUNTER
[employer] leave of absents form  Scanned in [employer] in Jillian Crick folder    Please advise

## 2022-11-30 ENCOUNTER — TELEPHONE (OUTPATIENT)
Dept: GASTROENTEROLOGY | Facility: CLINIC | Age: 22
End: 2022-11-30

## 2022-11-30 NOTE — TELEPHONE ENCOUNTER
Left msg for patient to call office regarding PPW  Need more info  Is he just wanting off a certain amount of time? Also is following with GI  They may be better suited to complete paperwork  PPW put in provider's folder

## 2022-11-30 NOTE — TELEPHONE ENCOUNTER
Pt's PCP has already started completing ppw per Epic documentation  We will be available should pt's employer need additional information though will defer completion of ppw to primary care team      Additionally, we did see pt in consult for chronic GI symptoms however he was following with local private GI group as well with additional w/u  Would encourage patient to plan to follow up with one group for continuity of care purposes

## 2022-11-30 NOTE — TELEPHONE ENCOUNTER
Spoke with patient in regards to [facility] paperwork, informed him his PCP is already completing this paper work  Patient would like to stay with [facility] for continuation of care  Thank you!

## 2022-12-07 ENCOUNTER — APPOINTMENT (OUTPATIENT)
Dept: LAB | Facility: CLINIC | Age: 22
End: 2022-12-07

## 2022-12-07 DIAGNOSIS — R19.7 DIARRHEA, UNSPECIFIED TYPE: ICD-10-CM

## 2022-12-07 DIAGNOSIS — R11.0 NAUSEA: ICD-10-CM

## 2022-12-07 DIAGNOSIS — K29.00 ACUTE GASTRITIS, PRESENCE OF BLEEDING UNSPECIFIED, UNSPECIFIED GASTRITIS TYPE: ICD-10-CM

## 2022-12-07 LAB
T3 SERPL-MCNC: 1.5 NG/ML (ref 0.6–1.8)
T4 SERPL-MCNC: 10.3 UG/DL (ref 4.7–13.3)
TSH SERPL DL<=0.05 MIU/L-ACNC: 1.04 UIU/ML (ref 0.45–4.5)

## 2022-12-12 NOTE — PROGRESS NOTES
[facility]        NAME: Qi Tavares is a 23 y o  male  : 2000    MRN: [id number]  DATE: 2022  TIME: 3:51 PM    Assessment and Plan   Epigastric pain [R10 13]  1  Epigastric pain  ondansetron (ZOFRAN) 4 mg tablet    omeprazole (PriLOSEC) 40 MG capsule   2  Intractable vomiting with nausea  ondansetron (ZOFRAN) 4 mg tablet    omeprazole (PriLOSEC) 40 MG capsule   3  Loose stools  ondansetron (ZOFRAN) 4 mg tablet    omeprazole (PriLOSEC) 40 MG capsule         Patient Instructions       Follow up with PCP in 3-5 days  Proceed to  ER if symptoms worsen  You may have H pylori which is a stomach bacteria  You are to take the zofran for nausea and vomiting  You are to take the omeprazole daily 1 hour in the AM prior to eating x 3 weeks  You must follow up with your PCP on 11/3 you need further testing   You are to go to the ED if symptoms worsen        Chief Complaint     Chief Complaint   Patient presents with   • Nausea     X 3 weeks          History of Present Illness       This is a 23year old male who states has had epigastric pain with dry heaving and loose stools for the last 3 weeks  He denies any fevers, chills  He states that he does have MM card and has really backed off over the last 3 weeks due to it causing nausea  He states he uses it for anxiety  He denies ETOH use, hx of ulcers  He is unable to relate symptoms to food  He states he went to a local urgent care and was told there was nothing wrong with him  He states he has a new PCP appt 11/3/2022  States he is urinating and eating as well as able to  He states he is aware of hyperemesis cannibus use and had thought about that and cut down using  He states he doesn't think that is what this is  Review of Systems   Review of Systems   Constitutional: Negative  HENT: Negative  Eyes: Negative  Respiratory: Negative  Gastrointestinal: Positive for abdominal pain, diarrhea and nausea  Endocrine: Negative  Genitourinary: Negative  Musculoskeletal: Negative  Skin: Negative  Allergic/Immunologic: Negative  Neurological: Negative  Hematological: Negative  Psychiatric/Behavioral: Negative  Current Medications       Current Outpatient Medications:   •  omeprazole (PriLOSEC) 40 MG capsule, Take 1 capsule (40 mg total) by mouth daily for 15 days, Disp: 15 capsule, Rfl: 0  •  ondansetron (ZOFRAN) 4 mg tablet, Take 1 tablet (4 mg total) by mouth every 8 (eight) hours as needed for nausea or vomiting, Disp: 20 tablet, Rfl: 0    Current Allergies     Allergies as of 10/20/2022   • (No Known Allergies)            The following portions of the patient's history were reviewed and updated as appropriate: allergies, current medications, past family history, past medical history, past social history, past surgical history and problem list      History reviewed  No pertinent past medical history  History reviewed  No pertinent surgical history  History reviewed  No pertinent family history  Medications have been verified  Objective   /80   Pulse 86   Temp 98 2 °F (36 8 °C)   Resp 20   Ht 6' 5" (1 956 m)   Wt 116 kg (256 lb)   SpO2 98%   BMI 30 36 kg/m²   No LMP for male patient  Physical Exam     Physical Exam  Vitals and nursing note reviewed  Constitutional:       General: He is not in acute distress  Appearance: Normal appearance  He is obese  He is not ill-appearing, toxic-appearing or diaphoretic  HENT:      Head: Normocephalic and atraumatic  Right Ear: Tympanic membrane and ear canal normal       Left Ear: Tympanic membrane and ear canal normal       Nose: Nose normal  No congestion or rhinorrhea  Mouth/Throat:      Mouth: Mucous membranes are moist       Pharynx: Oropharynx is clear  No oropharyngeal exudate or posterior oropharyngeal erythema  Eyes:      Extraocular Movements: Extraocular movements intact  Cardiovascular:      Rate and Rhythm: Normal rate and regular rhythm  Pulses: Normal pulses  Heart sounds: Normal heart sounds  Pulmonary:      Effort: Pulmonary effort is normal       Breath sounds: Normal breath sounds  Abdominal:      General: Bowel sounds are normal  There is no distension  Palpations: Abdomen is soft  Tenderness: There is no abdominal tenderness  Musculoskeletal:         General: Normal range of motion  Cervical back: Normal range of motion and neck supple  Skin:     General: Skin is warm and dry  Capillary Refill: Capillary refill takes less than 2 seconds  Neurological:      General: No focal deficit present  Mental Status: He is alert and oriented to person, place, and time  Psychiatric:         Mood and Affect: Mood normal          Behavior: Behavior normal          Thought Content: Thought content normal          Judgment: Judgment normal             offered pt H pylori testing by stool and states would not be able to go while [facility]  Lab has closed  He is instructed to follow up with PCP and GI as may need further testing even endoscopy  associated pain...

## 2023-01-05 ENCOUNTER — TELEPHONE (OUTPATIENT)
Dept: FAMILY MEDICINE CLINIC | Facility: CLINIC | Age: 23
End: 2023-01-05

## 2023-01-05 NOTE — TELEPHONE ENCOUNTER
Patient dropped off forms that need to be filled out with correct dates and would like to return to work Monday 01/09/02023, scanned and put in [employer] folder up [address] [facility]   Can be called when finished [phone]

## 2023-01-09 NOTE — TELEPHONE ENCOUNTER
Pt called back in regards to PPW asking if it was complete yet  advised him it is not  Informed patient PCP is out of office in Monday's now  Pt states he really needs these forms done to return to work  He plans to return day after these are signed, as he cannot return to work until we complete forms

## 2023-01-09 NOTE — TELEPHONE ENCOUNTER
Patient no showed his last 2 visits  I was going to get more information from him at this visit and complete it with him in case there were any questions  I will take a look at it when I am back in the office tomorrow

## 2023-02-17 ENCOUNTER — OFFICE VISIT (OUTPATIENT)
Dept: URGENT CARE | Facility: CLINIC | Age: 23
End: 2023-02-17

## 2023-02-17 VITALS
WEIGHT: 254 LBS | OXYGEN SATURATION: 99 % | RESPIRATION RATE: 20 BRPM | TEMPERATURE: 98.9 F | HEART RATE: 81 BPM | BODY MASS INDEX: 30.12 KG/M2

## 2023-02-17 DIAGNOSIS — G89.29 CHRONIC BILATERAL LOWER ABDOMINAL PAIN: Primary | ICD-10-CM

## 2023-02-17 DIAGNOSIS — R10.31 CHRONIC BILATERAL LOWER ABDOMINAL PAIN: Primary | ICD-10-CM

## 2023-02-17 DIAGNOSIS — R10.32 CHRONIC BILATERAL LOWER ABDOMINAL PAIN: Primary | ICD-10-CM

## 2023-02-17 NOTE — PATIENT INSTRUCTIONS
You are to call your GI doctor for follow up appointment and schedule the test that you need to have done     You are to go to the ED if symptoms worsen  Follow up with your PCP as well

## 2023-02-17 NOTE — PROGRESS NOTES
[facility]        NAME: Jermain Wagner is a 23 y o  male  : 2000    MRN: [id number]  DATE: 2023  TIME: 2:52 PM    Assessment and Plan   Chronic bilateral lower abdominal pain [R10 31, G89 29, R10 32]  1  Chronic bilateral lower abdominal pain              Patient Instructions       Follow up with PCP in 3-5 days  Proceed to  ER if symptoms worsen  You are to call your GI doctor for follow up appointment and schedule the test that you need to have done  You are to go to the ED if symptoms worsen  Follow up with your PCP as well       Chief Complaint     Chief Complaint   Patient presents with   • Abdominal Pain         History of Present Illness       This is a 23year old male who has seen GI and had endoscopy and colonoscopy since seen here in the fall  He states he has stopped using the MM and has been found to have ulcers  He is still being worked up for abdominal pain and vomiting  He states this morning he had attempted to go to work and had developed severe lower abdominal pain and when he had a BM he noted light pink blood when he wiped  Denies knowledge of hemorrhoids  He states he had wiped x 2 after and no further blood  Denies diarrhea - states stool was normal  He states he needs a work note for today  Review of Systems   Review of Systems   Constitutional: Negative  HENT: Negative  Eyes: Negative  Respiratory: Negative  Cardiovascular: Negative  Gastrointestinal: Positive for abdominal pain and blood in stool  Endocrine: Negative  Genitourinary: Negative  Musculoskeletal: Negative  Skin: Negative  Allergic/Immunologic: Negative  Neurological: Negative  Hematological: Negative  Psychiatric/Behavioral: Negative            Current Medications       Current Outpatient Medications:   •  famotidine (PEPCID) 20 mg tablet, Take 1 tablet (20 mg total) by mouth 2 (two) times a day, Disp: 90 tablet, Rfl: 3  •  ondansetron (ZOFRAN) 4 mg tablet, Take 1 tablet (4 mg total) by mouth every 8 (eight) hours as needed for nausea or vomiting, Disp: 20 tablet, Rfl: 0  •  pantoprazole (PROTONIX) 40 mg tablet, Take 1 tablet (40 mg total) by mouth 2 (two) times a day, Disp: 30 tablet, Rfl: 3    Current Allergies     Allergies as of 02/17/2023   • (No Known Allergies)            The following portions of the patient's history were reviewed and updated as appropriate: allergies, current medications, past family history, past medical history, past social history, past surgical history and problem list      History reviewed  No pertinent past medical history  History reviewed  No pertinent surgical history  History reviewed  No pertinent family history  Medications have been verified  Objective   Pulse 81   Temp 98 9 °F (37 2 °C)   Resp 20   Wt 115 kg (254 lb)   SpO2 99%   BMI 30 12 kg/m²   No LMP for male patient  Physical Exam     Physical Exam  Vitals and nursing note reviewed  Constitutional:       General: He is not in acute distress  Appearance: He is well-developed  He is obese  He is not ill-appearing, toxic-appearing or diaphoretic  HENT:      Head: Normocephalic and atraumatic  Mouth/Throat:      Mouth: Mucous membranes are moist    Eyes:      Extraocular Movements: Extraocular movements intact  Cardiovascular:      Rate and Rhythm: Normal rate and regular rhythm  Heart sounds: Normal heart sounds  No murmur heard  Pulmonary:      Effort: Pulmonary effort is normal       Breath sounds: Normal breath sounds  Abdominal:      General: Bowel sounds are normal       Palpations: Abdomen is soft  Tenderness: There is no abdominal tenderness  Skin:     General: Skin is warm and dry  Capillary Refill: Capillary refill takes less than 2 seconds  Neurological:      General: No focal deficit present  Mental Status: He is alert and oriented to person, place, and time     Psychiatric: Mood and Affect: Mood normal          Behavior: Behavior normal

## 2023-02-17 NOTE — LETTER
February 17, 2023     Patient: Mariah Brasher   YOB: 2000   Date of Visit: 2/17/2023       To Whom It May Concern: It is my medical opinion that Mariah Brasher may return to work on 2/20/2023  If you have any questions or concerns, please don't hesitate to call           Sincerely,        CHRISTIAN Aguilera    CC: No Recipients

## 2024-03-18 ENCOUNTER — APPOINTMENT (OUTPATIENT)
Dept: RADIOLOGY | Facility: CLINIC | Age: 24
End: 2024-03-18
Payer: COMMERCIAL

## 2024-03-18 ENCOUNTER — OFFICE VISIT (OUTPATIENT)
Dept: URGENT CARE | Facility: CLINIC | Age: 24
End: 2024-03-18
Payer: COMMERCIAL

## 2024-03-18 VITALS
OXYGEN SATURATION: 98 % | DIASTOLIC BLOOD PRESSURE: 88 MMHG | RESPIRATION RATE: 18 BRPM | SYSTOLIC BLOOD PRESSURE: 143 MMHG | TEMPERATURE: 97.8 F | HEART RATE: 85 BPM

## 2024-03-18 DIAGNOSIS — S97.81XA CRUSH INJURY OF FOOT, RIGHT, INITIAL ENCOUNTER: Primary | ICD-10-CM

## 2024-03-18 DIAGNOSIS — M79.671 RIGHT FOOT PAIN: ICD-10-CM

## 2024-03-18 DIAGNOSIS — S97.81XA CRUSH INJURY OF FOOT, RIGHT, INITIAL ENCOUNTER: ICD-10-CM

## 2024-03-18 PROCEDURE — 73630 X-RAY EXAM OF FOOT: CPT

## 2024-03-18 PROCEDURE — 99213 OFFICE O/P EST LOW 20 MIN: CPT | Performed by: NURSE PRACTITIONER

## 2024-03-18 NOTE — LETTER
March 19, 2024     Patient: Mercy Sandy   YOB: 2000   Date of Visit: 3/18/2024       To Whom It May Concern:    It is my medical opinion that Mercy Sandy may return to work on 3//20/2024 .    If you have any questions or concerns, please don't hesitate to call.         Sincerely,        CHRISTIAN Tang    CC: No Recipients

## 2024-03-18 NOTE — PATIENT INSTRUCTIONS
Your xray was preliminarily read by your provider.  A radiologist will read the xray and you will be notified if it is abnormal.    You are to wear the ace wrap as long as you are up moving around.  Elevate, apply ice. Take tylenol and motrin for pain  Follow up with your PCP in 3-5 days  Go to the ED if symptoms worsen   Follow up with podiatry if symptoms worsen  You have been given a referral if needed

## 2024-03-18 NOTE — PROGRESS NOTES
"  [facility]        NAME: Mercy Sandy is a 23 y.o. male  : 2000    MRN: [id number]  DATE: 2024  TIME: 4:01 PM    Assessment and Plan   Crush injury of foot, right, initial encounter [S97.81XA]  1. Crush injury of foot, right, initial encounter  XR foot 3+ vw right    Ambulatory Referral to Podiatry      2. Right foot pain  Ambulatory Referral to Podiatry            Patient Instructions       Follow up with PCP in 3-5 days.  Proceed to  ER if symptoms worsen.    If tests have been performed at [facility], our office will contact you with results if changes need to be made to the care plan discussed with you at the visit.  You can [facility] full [facility] on [facility].      Your xray was preliminarily read by your provider.  A radiologist will read the xray and you will be notified if it is abnormal.    You are to wear the ace wrap as long as you are up moving around.  Elevate, apply ice. Take tylenol and motrin for pain  Follow up with your PCP in 3-5 days  Go to the ED if symptoms worsen   Follow up with podiatry if symptoms worsen  You have been given a referral if needed       Chief Complaint     Chief Complaint   Patient presents with    Foot Injury     C/o right foot injury related to \"metal block for car engine, I set it on the shelf and it felt through\". Pt reports \"I was wearing steel-toe boots but a piece of the crank hit my foot above the steel toe boot\". Onset of event \"yesterday\". Denies seeking care day of event. Denies fall/head strike/LOC. Denies any OTC medications. Denies anticoags/ASA. +pedal pulse, + capillary refill to right foot         History of Present Illness       This is a 23 year old male who states was working on a metal block car engine and it fell through a shelf hitting the top of his right foot.  He states that he had steel toed boots on and it hit the shoe part. He states he has pain with bearing weight.  States had applied ice, taken tylenol " and motrin.  He states he couldn't get his steel toe boots on today and is wearing crocks with pain.  PMH is listed.         Review of Systems   Review of Systems   Constitutional: Negative.    HENT: Negative.     Eyes: Negative.    Respiratory: Negative.     Cardiovascular: Negative.    Gastrointestinal: Negative.    Endocrine: Negative.    Genitourinary: Negative.    Musculoskeletal:         Right foot pain    Skin: Negative.    Allergic/Immunologic: Negative.    Neurological: Negative.    Hematological: Negative.    Psychiatric/Behavioral: Negative.           Current Medications       Current Outpatient Medications:     famotidine (PEPCID) 20 mg tablet, Take 1 tablet (20 mg total) by mouth 2 (two) times a day, Disp: 90 tablet, Rfl: 3    ondansetron (ZOFRAN) 4 mg tablet, Take 1 tablet (4 mg total) by mouth every 8 (eight) hours as needed for nausea or vomiting, Disp: 20 tablet, Rfl: 0    pantoprazole (PROTONIX) 40 mg tablet, Take 1 tablet (40 mg total) by mouth 2 (two) times a day, Disp: 30 tablet, Rfl: 3    Current Allergies     Allergies as of 03/18/2024 - Reviewed 03/18/2024   Allergen Reaction Noted    Omeprazole Itching 03/18/2024            The following portions of the patient's history were reviewed and updated as appropriate: allergies, current medications, past family history, past medical history, past social history, past surgical history and problem list.     History reviewed. No pertinent past medical history.    History reviewed. No pertinent surgical history.    History reviewed. No pertinent family history.      Medications have been verified.        Objective   /88 (BP Location: Right arm, Patient Position: Sitting)   Pulse 85   Temp 97.8 °F (36.6 °C) (Temporal)   Resp 18   SpO2 98%   No LMP for male patient.       Physical Exam     Physical Exam  Vitals and nursing note reviewed.   Constitutional:       General: He is not in acute distress.     Appearance: Normal appearance. He is  "obese. He is not ill-appearing, toxic-appearing or diaphoretic.   Eyes:      Extraocular Movements: Extraocular movements intact.   Cardiovascular:      Rate and Rhythm: Normal rate.      Pulses: Normal pulses.   Pulmonary:      Effort: Pulmonary effort is normal.   Musculoskeletal:         General: Swelling, tenderness and signs of injury present. No deformity. Normal range of motion.      Cervical back: Normal range of motion.        Feet:    Skin:     General: Skin is warm and dry.      Capillary Refill: Capillary refill takes less than 2 seconds.   Neurological:      General: No focal deficit present.      Mental Status: He is alert and oriented to person, place, and time.   Psychiatric:         Mood and Affect: Mood normal.         Behavior: Behavior normal.         Thought Content: Thought content normal.         Judgment: Judgment normal.       Preliminary reading foot xray  No acute process seen  Waiting on rad read    Orthopedic injury treatment    Date/Time: 3/18/2024 3:55 PM    Performed by: CHRISTIAN Tang  Authorized by: CHRISTIAN Tang    Patient Location:  [facility] Protocol:  Procedure performed by: (Michelle DE ANDA)  Consent: The procedure was performed in an emergent situation. Verbal consent obtained. Written consent obtained.  Risks and benefits: risks, benefits and alternatives were discussed  Consent given by: patient  Time out: Immediately prior to procedure a \"time out\" was called to verify the correct patient, procedure, equipment, support staff and site/side marked as required.  Timeout called at: 3/18/2024 3:55 PM.  Patient understanding: patient states understanding of the procedure being performed  Patient consent: the patient's understanding of the procedure matches consent given  Procedure consent: procedure consent matches procedure scheduled  Relevant documents: relevant documents present and verified  Test results: test results available and properly " labeled  Site marked: the operative site was marked  Radiology Images displayed and confirmed. If images not available, report reviewed: imaging studies available  Required items: required blood products, implants, devices, and special equipment available  Patient identity confirmed: verbally with patient and hospital-assigned identification number    Injury location:  Foot  Location details:  Right foot  Injury type:  Soft tissue  Neurovascular status: Neurovascularly intact    Distal perfusion: normal    Neurological function: normal    Range of motion: normal    Immobilization:  Ace wrap  Neurovascular status: Neurovascularly intact    Distal perfusion: normal    Neurological function: normal    Range of motion: normal    Patient tolerance:  Patient tolerated the procedure well with no immediate complications

## 2024-03-19 ENCOUNTER — TELEPHONE (OUTPATIENT)
Age: 24
End: 2024-03-19

## 2024-03-19 ENCOUNTER — TELEPHONE (OUTPATIENT)
Dept: OBGYN CLINIC | Facility: HOSPITAL | Age: 24
End: 2024-03-19

## 2024-03-19 NOTE — TELEPHONE ENCOUNTER
Hello,  Please advise if the following patient can be forced onto the schedule:    Patient: Adelfo     : 2000    MRN: [id number]    Call back [phone]    Insurance: CBC     Reason for appointment: Ref for ASAP dropped heavy items  on Right foot     Requested doctor and/or location: Any/[place]       Thank you.

## 2024-03-19 NOTE — TELEPHONE ENCOUNTER
Caller: Patient    Doctor/Office:     Call regarding :  returning a call    Call was transferred to: Podiatry

## 2024-03-20 ENCOUNTER — TELEPHONE (OUTPATIENT)
Dept: URGENT CARE | Facility: CLINIC | Age: 24
End: 2024-03-20

## 2024-03-20 NOTE — LETTER
March 20, 2024      Patient: Mercy Sandy   YOB: 2000   Date of Visit: 3/18/2024       To Whom it May Concern:    Mercy Sandy was seen in my clinic on 3/18/2024 He may return to work on 3/21/2024 .    If you have any questions or concerns, please don't hesitate to call.         Sincerely,          CHRISTIAN Tang        CC: No Recipients

## 2024-03-20 NOTE — TELEPHONE ENCOUNTER
"Patient called and states that he has been taking tylenol and motrin for pain along with applying ice, wearing the brace and has done some \"epsom salt soaks\". HE states foot is slightly swollen and painful at times to walk.  Instructed pt not to due the warm soaks. He may do the others.  He states that the podiatrist is \"trying to get him in for April 1st appointment\".   He states he does need another work note he wasn't able to go to work today.    "

## 2024-09-13 ENCOUNTER — TELEPHONE (OUTPATIENT)
Dept: FAMILY MEDICINE CLINIC | Facility: CLINIC | Age: 24
End: 2024-09-13

## 2024-09-30 ENCOUNTER — OFFICE VISIT (OUTPATIENT)
Dept: URGENT CARE | Facility: CLINIC | Age: 24
End: 2024-09-30
Payer: COMMERCIAL

## 2024-09-30 VITALS
DIASTOLIC BLOOD PRESSURE: 87 MMHG | OXYGEN SATURATION: 99 % | RESPIRATION RATE: 20 BRPM | TEMPERATURE: 98.3 F | HEART RATE: 97 BPM | SYSTOLIC BLOOD PRESSURE: 150 MMHG

## 2024-09-30 DIAGNOSIS — J01.90 ACUTE VIRAL SINUSITIS: Primary | ICD-10-CM

## 2024-09-30 DIAGNOSIS — B97.89 ACUTE VIRAL SINUSITIS: Primary | ICD-10-CM

## 2024-09-30 PROCEDURE — G0382 LEV 3 HOSP TYPE B ED VISIT: HCPCS | Performed by: STUDENT IN AN ORGANIZED HEALTH CARE EDUCATION/TRAINING PROGRAM

## 2024-09-30 PROCEDURE — S9083 URGENT CARE CENTER GLOBAL: HCPCS | Performed by: STUDENT IN AN ORGANIZED HEALTH CARE EDUCATION/TRAINING PROGRAM

## 2024-09-30 RX ORDER — BROMPHENIRAMINE MALEATE, PSEUDOEPHEDRINE HYDROCHLORIDE, AND DEXTROMETHORPHAN HYDROBROMIDE 2; 30; 10 MG/5ML; MG/5ML; MG/5ML
5 SYRUP ORAL 4 TIMES DAILY PRN
Qty: 120 ML | Refills: 0 | Status: SHIPPED | OUTPATIENT
Start: 2024-09-30

## 2024-09-30 NOTE — PROGRESS NOTES
[facility]        NAME: Mercy Sandy is a 24 y.o. male  : 2000    MRN: [id number]  DATE: 2024  TIME: 4:11 PM    Assessment and Orders   Acute viral sinusitis [J01.90, B97.89]  1. Acute viral sinusitis  brompheniramine-pseudoephedrine-DM 30-2-10 MG/5ML syrup            Plan and Discussion      Symptoms and exam consistent with viral sinusitis. Will treat symptoms with Bromfed.     Risks and benefits discussed. Patient understands and agrees with the plan.     PATIENT INSTRUCTIONS    AAFP Article from - Clinical Diagnosis of Acute Bacterial Rhinosinusitis  ENMANUEL MA, [facility], [facility], AND WOODY DUGGAN MD, Health Research Board, [employer] in [place], [place], [place]     Acute rhinosinusitis in adults is defined as sinonasal inflammation that lasts less than four weeks and is associated with the sudden onset of symptoms.1 In the 2012 National Health Interview Survey, 12% of respondents reported being diagnosed with rhinosinusitis in the previous 12 months.2 In 2016, there were 8 million [place] ambulatory visits for acute sinusitis.3 Acute bacterial rhinosinusitis develops in only 0.5% to 2% of all upper respiratory tract infections.4  A 2018 [place] review demonstrated that the potential benefit of antibiotics for the treatment of acute rhinosinusitis, diagnosed clinically or confirmed with imaging, is marginal.5 Without antibiotics, rhinosinusitis resolved in 46% of patients after one week and in 64% of patients after 14 days.5 Antibiotics can shorten time to resolution but in only five to 11 more people per 100 compared with placebo or no treatment.        If tests have been performed at [facility], our office will contact you with results if changes need to be made to the care plan discussed with you at the visit.  You can review your full results on [facility].    Follow up with PCP.     If any of the following occur, please report to your nearest ED for  evaluation or call 911.   Difficultly breathing or shortness of breath  Chest pain  Acutely worsening symptoms.         Chief Complaint     Chief Complaint   Patient presents with    Headache    Cold Like Symptoms    Cough         History of Present Illness       Rapid COVID negative.       Headache  Cough  Associated symptoms include ear pain (ear popping), headaches and a sore throat.   URI   This is a new problem. The current episode started in the past 7 days. The problem has been gradually worsening. There has been no fever. Associated symptoms include congestion, coughing, ear pain (ear popping), headaches and a sore throat. Pertinent negatives include no nausea or vomiting.       Review of Systems   Review of Systems   HENT:  Positive for congestion, ear pain (ear popping) and sore throat.    Respiratory:  Positive for cough.    Gastrointestinal:  Negative for nausea and vomiting.   Neurological:  Positive for headaches.         Current Medications       Current Outpatient Medications:     brompheniramine-pseudoephedrine-DM 30-2-10 MG/5ML syrup, Take 5 mL by mouth 4 (four) times a day as needed for allergies, congestion or cough, Disp: 120 mL, Rfl: 0    famotidine (PEPCID) 20 mg tablet, Take 1 tablet (20 mg total) by mouth 2 (two) times a day, Disp: 90 tablet, Rfl: 3    ondansetron (ZOFRAN) 4 mg tablet, Take 1 tablet (4 mg total) by mouth every 8 (eight) hours as needed for nausea or vomiting (Patient not taking: Reported on 9/30/2024), Disp: 20 tablet, Rfl: 0    pantoprazole (PROTONIX) 40 mg tablet, Take 1 tablet (40 mg total) by mouth 2 (two) times a day (Patient not taking: Reported on 9/30/2024), Disp: 30 tablet, Rfl: 3    Current Allergies     Allergies as of 09/30/2024 - Reviewed 09/30/2024   Allergen Reaction Noted    Omeprazole Itching 03/18/2024            The following portions of the patient's history were reviewed and updated as appropriate: allergies, current medications, past family history,  past medical history, past social history, past surgical history and problem list.     Past Medical History:   Diagnosis Date    GERD (gastroesophageal reflux disease)        History reviewed. No pertinent surgical history.    History reviewed. No pertinent family history.      Medications have been verified.        Objective   /87   Pulse 97   Temp 98.3 °F (36.8 °C)   Resp 20   SpO2 99%   No LMP for male patient.       Physical Exam     Physical Exam  Constitutional:       General: He is not in acute distress.     Appearance: He is not ill-appearing or toxic-appearing.   HENT:      Head: Normocephalic and atraumatic.      Right Ear: Tympanic membrane and external ear normal.      Left Ear: Tympanic membrane and external ear normal.      Nose: Congestion present.      Comments: Boggy nasal turbinates     Mouth/Throat:      Pharynx: Posterior oropharyngeal erythema present.      Comments: Cobblestone appearance in posterior pharynx  Cardiovascular:      Rate and Rhythm: Normal rate and regular rhythm.   Pulmonary:      Effort: Pulmonary effort is normal. No respiratory distress.      Breath sounds: No wheezing.   Neurological:      General: No focal deficit present.      Mental Status: He is alert and oriented to person, place, and time.   Psychiatric:         Mood and Affect: Mood normal.         Behavior: Behavior normal.         Thought Content: Thought content normal.         Judgment: Judgment normal.               Danielle Whittington DO

## 2024-09-30 NOTE — LETTER
September 30, 2024     Patient: Mercy Sandy   YOB: 2000   Date of Visit: 9/30/2024       To Whom It May Concern:    It is my medical opinion that Mercy Sandy may return to work on 10/2/2024 .    If you have any questions or concerns, please don't hesitate to call.         Sincerely,        Danielle Whittington,     CC: No Recipients

## 2024-09-30 NOTE — PATIENT INSTRUCTIONS
PATIENT INSTRUCTIONS    AAFP Article from 2020- Clinical Diagnosis of Acute Bacterial Rhinosinusitis  ENMANUEL MA, [facility], [facility], AND WOODY DUGGAN MD, Health Research Board, [employer] in [place], [place], [place]     Acute rhinosinusitis in adults is defined as sinonasal inflammation that lasts less than four weeks and is associated with the sudden onset of symptoms.1 In the 2012 National Health Interview Survey, 12% of respondents reported being diagnosed with rhinosinusitis in the previous 12 months.2 In 2016, there were 8 million [place] ambulatory visits for acute sinusitis.3 Acute bacterial rhinosinusitis develops in only 0.5% to 2% of all upper respiratory tract infections.4  A 2018 Moust[place]ha review demonstrated that the potential benefit of antibiotics for the treatment of acute rhinosinusitis, diagnosed clinically or confirmed with imaging, is marginal.5 Without antibiotics, rhinosinusitis resolved in 46% of patients after one week and in 64% of patients after 14 days.5 Antibiotics can shorten time to resolution but in only five to 11 more people per 100 compared with placebo or no treatment.        If tests have been performed at [facility], our office will contact you with results if changes need to be made to the care plan discussed with you at the visit.  You can review your full results on [facility] MyChart.    Follow up with PCP.     If any of the following occur, please report to your nearest ED for evaluation or call 911.   Difficultly breathing or shortness of breath  Chest pain  Acutely worsening symptoms.

## 2025-01-13 ENCOUNTER — APPOINTMENT (OUTPATIENT)
Dept: RADIOLOGY | Facility: CLINIC | Age: 25
End: 2025-01-13
Payer: COMMERCIAL

## 2025-01-13 ENCOUNTER — OFFICE VISIT (OUTPATIENT)
Dept: URGENT CARE | Facility: CLINIC | Age: 25
End: 2025-01-13
Payer: COMMERCIAL

## 2025-01-13 VITALS
TEMPERATURE: 98 F | WEIGHT: 276 LBS | BODY MASS INDEX: 32.59 KG/M2 | OXYGEN SATURATION: 99 % | DIASTOLIC BLOOD PRESSURE: 80 MMHG | HEART RATE: 76 BPM | RESPIRATION RATE: 18 BRPM | SYSTOLIC BLOOD PRESSURE: 144 MMHG | HEIGHT: 77 IN

## 2025-01-13 DIAGNOSIS — S80.11XA CONTUSION OF RIGHT LOWER LEG, INITIAL ENCOUNTER: ICD-10-CM

## 2025-01-13 DIAGNOSIS — M79.604 ACUTE LEG PAIN, RIGHT: ICD-10-CM
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
## 2025-01-13 DIAGNOSIS — M25.571 ACUTE RIGHT ANKLE PAIN: ICD-10-CM

## 2025-01-13 DIAGNOSIS — S76.011A HIP STRAIN, RIGHT, INITIAL ENCOUNTER: ICD-10-CM

## 2025-01-13 DIAGNOSIS — M76.60 ACHILLES TENDON PAIN: Primary | ICD-10-CM

## 2025-01-13 PROCEDURE — 73610 X-RAY EXAM OF ANKLE: CPT

## 2025-01-13 PROCEDURE — S9083 URGENT CARE CENTER GLOBAL: HCPCS | Performed by: NURSE PRACTITIONER

## 2025-01-13 PROCEDURE — 73590 X-RAY EXAM OF LOWER LEG: CPT

## 2025-01-13 PROCEDURE — G0382 LEV 3 HOSP TYPE B ED VISIT: HCPCS | Performed by: NURSE PRACTITIONER

## 2025-01-13 NOTE — LETTER
January 13, 2025     Patient: Mercy Sandy   YOB: 2000   Date of Visit: 1/13/2025       To Whom It May Concern:    It is my medical opinion that Mercy Sandy He may not return to work until seen and cleared by podiatry.    If you have any questions or concerns, please don't hesitate to call.         Sincerely,        CHRISTIAN Tang    CC: No Recipients

## 2025-01-13 NOTE — PROGRESS NOTES
[facility]        NAME: Mercy Sandy is a 24 y.o. male  : 2000    MRN: [id number]  DATE: 2025  TIME: 4:20 PM    Assessment and Plan   Achilles tendon pain [M76.60]  1. Achilles tendon pain  Ambulatory Referral to Podiatry      2. Contusion of right lower leg, initial encounter        3. Hip strain, right, initial encounter        4. Acute right ankle pain  XR ankle 3+ vw right    Ambulatory Referral to Podiatry      5. Acute leg pain, right  XR tibia fibula 2 vw right      6. Fall, initial encounter  XR tibia fibula 2 vw right    XR ankle 3+ vw right    Ambulatory Referral to Podiatry            Patient Instructions       Follow up with PCP in 3-5 days.  Proceed to  ER if symptoms worsen.    If tests have been performed at [facility], our office will contact you with results if changes need to be made to the care plan discussed with you at the visit.  You can review your full results on [facility] My[facility].    Your xray was preliminarily read by your provider.  A radiologist will read the xray and you will be notified if it is abnormal.    You are to Rest, Ice, compression (ace wrap, splint) elevate  Do not remove the splint until you are instructed to do so.   You are to wear the boot as long as you are up moving around - you do not have to wear to bed  Take tylenol or motrin as needed.  You are to see your PCP   Go to the ED if symptoms worsen.    You have been given a referral for podiatry - call tomorrow for appointment  Your employer may not let you wear the boot to work - discuss with them.              Chief Complaint     Chief Complaint   Patient presents with    Fall     Fell on the snow injuring right achilles area , pain in right lower leg, right hip for 1 day         History of Present Illness       This is a 24 year old male who comes to [facility] with c/o right achilles pain and lateral leg pain after a fall x 1 day.  He states he was walking up the He states he did  twist the ankle and fell on to the leg. He states he has hip pain but did not land on the hip and thinks he only twisted the hip. HE has taken motrin for pain.  He denies ever having surgery or fractures of leg, ankle. Denies having an orthopedist.   He states he is able be weight.  He states he works at [employer] and is concerned he may need to be on light duty.  PMH is listed and reviewed.     Fall        Review of Systems   Review of Systems   Constitutional: Negative.    HENT: Negative.     Eyes: Negative.    Respiratory: Negative.     Cardiovascular: Negative.    Gastrointestinal: Negative.    Endocrine: Negative.    Genitourinary: Negative.    Musculoskeletal:         Right leg pain  Right achilles pain   Right hip pain      Skin: Negative.    Allergic/Immunologic: Negative.    Neurological: Negative.    Hematological: Negative.    Psychiatric/Behavioral: Negative.           Current Medications       Current Outpatient Medications:     brompheniramine-pseudoephedrine-DM 30-2-10 MG/5ML syrup, Take 5 mL by mouth 4 (four) times a day as needed for allergies, congestion or cough (Patient not taking: Reported on 1/13/2025), Disp: 120 mL, Rfl: 0    famotidine (PEPCID) 20 mg tablet, Take 1 tablet (20 mg total) by mouth 2 (two) times a day (Patient not taking: Reported on 1/13/2025), Disp: 90 tablet, Rfl: 3    ondansetron (ZOFRAN) 4 mg tablet, Take 1 tablet (4 mg total) by mouth every 8 (eight) hours as needed for nausea or vomiting (Patient not taking: Reported on 1/13/2025), Disp: 20 tablet, Rfl: 0    pantoprazole (PROTONIX) 40 mg tablet, Take 1 tablet (40 mg total) by mouth 2 (two) times a day (Patient not taking: Reported on 1/13/2025), Disp: 30 tablet, Rfl: 3    Current Allergies     Allergies as of 01/13/2025 - Reviewed 01/13/2025   Allergen Reaction Noted    Omeprazole Itching 03/18/2024            The following portions of the patient's history were reviewed and updated as appropriate: allergies, current  "medications, past family history, past medical history, past social history, past surgical history and problem list.     Past Medical History:   Diagnosis Date    GERD (gastroesophageal reflux disease)        History reviewed. No pertinent surgical history.    History reviewed. No pertinent family history.      Medications have been verified.        Objective   /80   Pulse 76   Temp 98 °F (36.7 °C) (Temporal)   Resp 18   Ht 6' 5\" (1.956 m)   Wt 125 kg (276 lb)   SpO2 99%   BMI 32.73 kg/m²   No LMP for male patient.       Physical Exam     Physical Exam  Vitals and nursing note reviewed.   Constitutional:       General: He is not in acute distress.     Appearance: Normal appearance. He is obese. He is not ill-appearing, toxic-appearing or diaphoretic.   HENT:      Head: Normocephalic and atraumatic.      Nose: Nose normal.      Mouth/Throat:      Mouth: Mucous membranes are moist.   Eyes:      Extraocular Movements: Extraocular movements intact.   Cardiovascular:      Rate and Rhythm: Normal rate.      Pulses: Normal pulses.   Pulmonary:      Effort: Pulmonary effort is normal.   Musculoskeletal:         General: Tenderness and signs of injury present. No swelling or deformity. Normal range of motion.      Cervical back: Normal range of motion.      Comments: Right hip/trochanter area - no bruising, edema, deformity. No rotation of the hip.  No TTP  Right lateral lower leg - TTP at fibula shaft - no edema, bruising or deformity  Muscle strength 5/5.   Right ankle/achilles tendon - no bogginess.  Negative pierre test.   FROM - able to bear weight   + pedal pulse. NVI    Skin:     General: Skin is warm and dry.      Capillary Refill: Capillary refill takes less than 2 seconds.   Neurological:      General: No focal deficit present.      Mental Status: He is alert and oriented to person, place, and time.   Psychiatric:         Mood and Affect: Mood normal.         Behavior: Behavior normal.         " Thought Content: Thought content normal.         Judgment: Judgment normal.           Preliminary reading tib/fib   Negative for acute process  Waiting on rad read      Preliminary reading right ankle  No acute osseous abnormality  Waiting on rad read      Cam boot applied by Monica DE ANDA  Pt tolerated procedure   Educated on not driving with cam boot on right foot

## 2025-01-13 NOTE — PATIENT INSTRUCTIONS
Your xray was preliminarily read by your provider.  A radiologist will read the xray and you will be notified if it is abnormal.    You are to Rest, Ice, compression (ace wrap, splint) elevate  Do not remove the splint until you are instructed to do so.   You are to wear the boot as long as you are up moving around - you do not have to wear to bed  Take tylenol or motrin as needed.  You are to see your PCP   Go to the ED if symptoms worsen.    You have been given a referral for podiatry - call tomorrow for appointment  Your employer may not let you wear the boot to work - discuss with them.

## 2025-01-23 ENCOUNTER — OFFICE VISIT (OUTPATIENT)
Age: 25
End: 2025-01-23
Payer: COMMERCIAL

## 2025-01-23 DIAGNOSIS — M76.60 ACHILLES TENDON PAIN: ICD-10-CM
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
## 2025-01-23 DIAGNOSIS — M25.571 ACUTE RIGHT ANKLE PAIN: ICD-10-CM

## 2025-01-23 PROCEDURE — 99203 OFFICE O/P NEW LOW 30 MIN: CPT

## 2025-01-23 NOTE — ASSESSMENT & PLAN NOTE
Orders:    Ambulatory Referral to Podiatry    XR ankle 3+ vw right; Future    MRI ankle/heel right wo contrast; Future    Ambulatory Referral to Physical Therapy; Future

## 2025-01-23 NOTE — ASSESSMENT & PLAN NOTE
Orders:    Ambulatory Referral to Podiatry    XR ankle 3+ vw right; Future    Ambulatory Referral to Physical Therapy; Future

## 2025-01-23 NOTE — PROGRESS NOTES
Name: Mercy Snady      : 2000      MRN: [id number]  Encounter Provider: Ovidio Kwon DPM  Encounter Date: 2025   [facility]  :  Assessment & Plan  Achilles tendon pain    Orders:    Ambulatory Referral to Podiatry    XR ankle 3+ vw right; Future    MRI ankle/heel right wo contrast; Future    Ambulatory Referral to Physical Therapy; Future    Acute right ankle pain    Orders:    Ambulatory Referral to Podiatry    XR ankle 3+ vw right; Future    Ambulatory Referral to Physical Therapy; Future    Fall, initial encounter    Orders:    Ambulatory Referral to Podiatry    XR ankle 3+ vw right; Future    Painful Achilles Tendonitis Treatment:    Rest, Ice, Compression, Elevate foot.  Anti-inflammatory medications (if you are able to take)  Shoe modifications - Heel Lifts  Orthotics  Home exercises  MRI ordered to r/o partial tear of achilles tendon    If no improvement:  Physical Therapy   Immobilization in boot  MRI    If conservative treatment fails:  Surgery    I personally discussed with patient at the visit today:     The diagnosis of this encounter  2.   Possible etiologies of the diagnosis  3.   Treatment options including advantages and disadvantages of treatment with potential risks and complications associated with these treatments  4.   Prevention strategies and ways to decrease pain     I answered all of the patients questions.    History of Present Illness   HPI  Mercy Sandy is a 24 y.o. 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 He reports pain while working and weightbearing to the back of his heel. He has no other concerns today.     DOI 1/10/2024  History obtained from: patient    Review of Systems  Current Outpatient Medications on File Prior to Visit   Medication Sig Dispense Refill    brompheniramine-pseudoephedrine-DM 30-2-10 MG/5ML syrup Take 5 mL by mouth 4 (four) times a day as needed  for allergies, congestion or cough (Patient not taking: Reported on 1/13/2025) 120 mL 0    famotidine (PEPCID) 20 mg tablet Take 1 tablet (20 mg total) by mouth 2 (two) times a day (Patient not taking: Reported on 1/13/2025) 90 tablet 3    ondansetron (ZOFRAN) 4 mg tablet Take 1 tablet (4 mg total) by mouth every 8 (eight) hours as needed for nausea or vomiting (Patient not taking: Reported on 9/30/2024) 20 tablet 0    pantoprazole (PROTONIX) 40 mg tablet Take 1 tablet (40 mg total) by mouth 2 (two) times a day (Patient not taking: Reported on 1/23/2025) 30 tablet 3     No current facility-administered medications on file prior to visit.         Objective   There were no vitals taken for this visit.     Physical Exam  Vascular: Intact pedal pulses bilateral DP and PT.  Neurological: Gross protective sensation intact bilateral  Musculoskeletal: Muscle strength bilateral intact with dorsiflexion, inversion, eversion and plantarflexion.  Tenderness on palpation at the insertion point of the Achilles tendon.  There is intact muscle strength on plantarflexion.  There is tenderness on maximal dorsiflexion at the level of the ankle at the insertion point of the Achilles tendon.  There is some mild swelling is noted around the Achilles tendon insertion point.  No palpable dell in the Achilles tendon.  Dermatological: No open lesions or ulcerations noted bilateral.

## 2025-01-24 ENCOUNTER — HOSPITAL ENCOUNTER (OUTPATIENT)
Dept: MRI IMAGING | Facility: HOSPITAL | Age: 25
Discharge: HOME | End: 2025-01-24
Payer: COMMERCIAL

## 2025-01-24 ENCOUNTER — TELEPHONE (OUTPATIENT)
Dept: URGENT CARE | Facility: CLINIC | Age: 25
End: 2025-01-24

## 2025-01-24 DIAGNOSIS — M76.60 ACHILLES TENDON PAIN: ICD-10-CM

## 2025-01-24 PROCEDURE — 73721 MRI JNT OF LWR EXTRE W/O DYE: CPT

## 2025-01-24 NOTE — TELEPHONE ENCOUNTER
FMLA forms received today for pt visit on 1/13/25.  [facility] does not complete this type of form.   [facility] at [facility] and referred to [facility]iatry.  Pt is currently following up with podiatry Dr. Kwon.  Informed Past[employer] to reach out to insurance company that sent forms to reach out to podiatry.

## 2025-01-28 ENCOUNTER — OFFICE VISIT (OUTPATIENT)
Age: 25
End: 2025-01-28
Payer: COMMERCIAL

## 2025-01-28 VITALS — WEIGHT: 276 LBS | BODY MASS INDEX: 32.59 KG/M2 | HEIGHT: 77 IN

## 2025-01-28 DIAGNOSIS — M25.571 ACUTE RIGHT ANKLE PAIN: Primary | ICD-10-CM

## 2025-01-28 DIAGNOSIS — M76.60 ACHILLES TENDON PAIN: ICD-10-CM

## 2025-01-28 PROCEDURE — 99213 OFFICE O/P EST LOW 20 MIN: CPT

## 2025-01-28 NOTE — PROGRESS NOTES
Name: Mercy Sandy      : 2000      MRN: [id number]  Encounter Provider: Ovidio Kwon DPM  Encounter Date: 2025   Encounter [facility]  :  Assessment & Plan  Acute right ankle pain         Achilles tendon pain         Painful Achilles Tendonitis Treatment:    Rest, Ice, Compression, Elevate foot.  Anti-inflammatory medications (if you are able to take)  Shoe modifications - Heel Lifts  Patient is currently in Cam boot, okay to transition to supportive sneaker as tolerated  Orthotics  Home exercises  Referral to physical therapy placed  MRI reviewed Achilles tendon intact    If no improvement:  Immobilization in boot  MRI    If conservative treatment fails:  Surgery    I personally discussed with patient at the visit today:     The diagnosis of this encounter  2.   Possible etiologies of the diagnosis  3.   Treatment options including advantages and disadvantages of treatment with potential risks and complications associated with these treatments  4.   Prevention strategies and ways to decrease pain     I answered all of the patients questions.    History of Present Illness   HPI  Mercy Sandy is a 24 y.o. male who presents for follow-up of right ankle MRI.  Patient has been taking it easy and weightbearing as tolerated in cam boot.  He has noticed some improvement in symptoms since he has been off of foot.  Initial injury was early January when he was pushing heavy axles in a lunging motion he reports he is still having some pain in his Achilles tendon      DOI 1/10/2024  History obtained from: patient    Review of Systems  Current Outpatient Medications on File Prior to Visit   Medication Sig Dispense Refill    brompheniramine-pseudoephedrine-DM 30-2-10 MG/5ML syrup Take 5 mL by mouth 4 (four) times a day as needed for allergies, congestion or cough (Patient not taking: Reported on 2025) 120 mL 0    famotidine (PEPCID) 20 mg tablet Take 1  "tablet (20 mg total) by mouth 2 (two) times a day (Patient not taking: Reported on 1/13/2025) 90 tablet 3    ondansetron (ZOFRAN) 4 mg tablet Take 1 tablet (4 mg total) by mouth every 8 (eight) hours as needed for nausea or vomiting (Patient not taking: Reported on 9/30/2024) 20 tablet 0    pantoprazole (PROTONIX) 40 mg tablet Take 1 tablet (40 mg total) by mouth 2 (two) times a day (Patient not taking: Reported on 1/23/2025) 30 tablet 3     No current facility-administered medications on file prior to visit.         Objective   Ht 6' 5\" (1.956 m)   Wt 125 kg (276 lb)   BMI 32.73 kg/m²      Physical Exam  Vascular: Intact pedal pulses bilateral DP and PT.  Neurological: Gross protective sensation intact bilateral  Musculoskeletal: Muscle strength bilateral intact with dorsiflexion, inversion, eversion and plantarflexion.  Tenderness on palpation at the insertion point of the Achilles tendon.  There is intact muscle strength on plantarflexion.  There is tenderness on maximal dorsiflexion at the level of the ankle at the insertion point of the Achilles tendon.  There is some mild swelling is noted around the Achilles tendon insertion point.  No palpable dell in the Achilles tendon.  Dermatological: No open lesions or ulcerations noted bilateral.      "

## 2025-01-30 ENCOUNTER — EVALUATION (OUTPATIENT)
Dept: PHYSICAL THERAPY | Facility: CLINIC | Age: 25
End: 2025-01-30
Payer: COMMERCIAL

## 2025-01-30 DIAGNOSIS — M25.571 ACUTE RIGHT ANKLE PAIN: ICD-10-CM

## 2025-01-30 DIAGNOSIS — M76.60 ACHILLES TENDON PAIN: ICD-10-CM

## 2025-01-30 PROCEDURE — 97110 THERAPEUTIC EXERCISES: CPT | Performed by: PHYSICAL THERAPIST

## 2025-01-30 PROCEDURE — 97161 PT EVAL LOW COMPLEX 20 MIN: CPT | Performed by: PHYSICAL THERAPIST

## 2025-01-30 NOTE — PROGRESS NOTES
PT Evaluation     Today's date: 2025  Patient name: Mercy Sandy  : 2000  MRN: [id number]  Referring provider: Ovidio Kwon DPM  Dx:   Encounter Diagnosis     ICD-10-CM    1. Achilles tendon pain  M76.60 Ambulatory Referral to Physical Therapy      2. Acute right ankle pain  M25.571 Ambulatory Referral to Physical Therapy                     Assessment  Symptom irritability: moderate    Assessment details: Mercy Sandy is a pleasant 24 y.o. presenting to physical therapy with MD referral for Achilles tendon pain and Acute right ankle pain.    Problem list:  Limited ankle ROM, decreased hip/core strength, limited lower extremity flexibility, decreased balance, abnormal squat mechanics, and abnormal gait.    Treatment to include: Manual therapy techniques, ankle ROM, lower extremity/core strengthening, neuromuscular control exercises, balance/proprioception training, gait training as needed, squat retraining, instruction in a comprehensive HEP, and modalities as needed.     This pt would benefit from skilled PT services to address their impairments and functional limitations to maximize functional outcome.     Barriers to therapy: Manual load of job  Understanding of Dx/Px/POC: good     Prognosis: good    Goals  ST. Pt will improve ankle DF ROM at least least 11 degrees in 4 weeks.  2. Pt will be able to squat to at least 60 degrees knee flexion in 4 weeks.    LT. Pt will be able to walk on inclines with minimal to no pain in 8 weeks.  2. Pt will be independent in a comprehensive HEP in 8 weeks  3. 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     Plan  Patient would benefit from: skilled physical therapy    Frequency: 2-3x week  Duration in weeks: 8  Plan of Care beginning date: 2025  Plan of Care expiration date: 3/28/2025  Treatment plan discussed with: patient      Subjective Evaluation    History of Present Illness  Mechanism  "of injury: Pt builds semi trucks for a living. He was pushing an andrew a little too hard in 2024.He thought he overstretched his achilles at that time. He gave himself a few weeks of \"light duty\" to rest.  , pt felt significant pain and he has not returned to work since.  On , pt states he fell when walking up the stairs due to his right achilles tightening and then loosened and then pt was in pain. Pt went to urgent care who placed pt in a CAM boot and took an xray. Pt was referred to podiatry who did an MRI. MRI 25 revealed \"IMPRESSION:  1. 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  2. Unremarkable appearance to the Achilles tendon.\"    Pt continues to wear CAM boot; however, did not wear it today secondary to driving to his appointment.     Premorbid status:  - ADLs: Independent with no difficulty  - Work: Full time, Full duty  - Recreation: occasional walks    Current status:  - ADLs/Functional activities:   - Stairs Step to pattern with Pain Levels: moderate pain   - Sit to stand with mild/moderate pain   - Walking 10 minutes over level surface prior to increase in pain   - Walking uphill with instant increase in pain   - Standing 10 minutes prior to increase in pain   - Sitting unlimited without issue   - Sleeping with 0 nightly sleep disturbances due to pain  - Work: Unable to work secondary to dysfunction  - Recreation: none     Patient Goals  Patient goals for therapy: decreased pain, increased motion, increased strength and return to work    Pain  Current pain ratin  At best pain ratin  At worst pain ratin  Location: pain in achilles and calcaneus  Quality: sharp, pulling and tight  Relieving factors: rest and ice (rubbing tendon)  Aggravating factors: stair climbing, walking and standing  Progression: improved      Diagnostic Tests  X-ray: normal  MRI studies: " "abnormal  Treatments  Previous treatment: medication and immobilization  Current treatment: immobilization and physical therapy      Objective     Observations     Additional Observation Details  Static standing: Pt's weight shifted over LLE    Palpation     Additional Palpation Details  TTP over distal achilles insertion, medial calcaneal tubercle, and plantar fascia on R.    Active Range of Motion   Left Ankle/Foot   Dorsiflexion (ke): 6 degrees   Plantar flexion: 62 degrees   Inversion: 36 degrees   Eversion: 25 degrees     Right Ankle/Foot   Dorsiflexion (ke): 4 degrees   Plantar flexion: 64 degrees   Inversion: 40 degrees   Eversion: 22 degrees     Passive Range of Motion   Left Ankle/Foot    Great toe extension: 40 degrees     Right Ankle/Foot    Dorsiflexion (ke): 8 degrees   Great toe extension: 40 degrees     Strength/Myotome Testing     Left Hip   Planes of Motion   Flexion: 5  External rotation: 4+  Internal rotation: 4+    Right Hip   Planes of Motion   Flexion: 4  External rotation: 4  Internal rotation: 4    Left Knee   Flexion: 5  Extension: 5    Right Knee   Flexion: 4  Extension: 4    Left Ankle/Foot   Dorsiflexion: 5  Plantar flexion: 5    Right Ankle/Foot   Dorsiflexion: 4+  Plantar flexion: 4  Inversion: 5  Eversion: 5    Additional Strength Details  SLS L: 30\"  SLS R: 30\"    Double leg Squat: 2/3 reps with mild lateral shift to the left with increased toe out on R with no assistive device.    Gait: Pt ambulates over level surface with increased toe out  on RLE with sneaker donned at a slow pace.    Single leg heel raise:  L: 25 reps  R: 9 reps prior to increase in knee bend              Precautions: none  CO-MORBIDITIES: hx of finger fracture  HEP ACCESS CODE: [id number]  (Updated: 1-30-25)  FOTO Completed On: 1-30-25    POC Expires Reeval for Medicare to be completed Re-eval last completed on Unit Limit Auth Start Date Auth Expiration Date PT/OT/ST  Visit Limit   3-28-25 By visit  10  unknown NA " "NA 90                         TREATMENT DIARY  Auth Status DATE 1-30 (IE)            NA Visit # 1             Remaining 9            MANUAL THERAPY             IASTM to gastroc/achilles NV            IASTM to plantar fascia NV                                                                THERAPEUTIC EXERCISE             NuStep to improve LE strength/endurance 10 mins, L4 NV                         Standing:             - SB gastroc stretch 4 x 30\" NV            - SB soleus stretch 4 x 30\" NV                         Seated:             - plantar fascia stretch with towel in long sit 4 x 30\" NV            - great toe extension stretch 4 x 30\" NV            - TB ankle 4 way 2 x 10 ea GTB NV                         Table:             - Bridges with TB around knees 20 x 5\" RTB NV            - SL hip ER with TB 20 x 5\" ea RTB NV                                      NEUROMUSCULAR REEDUCATION              Standing:             - BIODEX:                  - LOS NV                 - Maze NV                                                                                                                                              THERAPEUTIC ACTIVITY             Front Step ups 2 x 10 6\" step NV            Lateral step ups 2 x 10 6\" step NV            Mini squats 2 x 10 NV            Heel raises 2 x 10 NV            Toe raises 2 x 10 NV                                                                                                                                                           GAIT TRAINING                                                                              MODALITIES             Cryo as needed                               * On initial evaluation, educated pt on anatomy, pathology, and exercise rationale. Provided pt with basic HEP and ensured proper exercise performance. Educated pt to call with any questions or concerns.  Educated pt he can get a heel insert (showed on [employer], but can be purchased " wherever pt would like) that can peel away a .25 inch at a time to wean out of CAM boot into sneakers.

## 2025-02-03 ENCOUNTER — OFFICE VISIT (OUTPATIENT)
Dept: PHYSICAL THERAPY | Facility: CLINIC | Age: 25
End: 2025-02-03
Payer: COMMERCIAL

## 2025-02-03 DIAGNOSIS — M25.571 ACUTE RIGHT ANKLE PAIN: ICD-10-CM

## 2025-02-03 DIAGNOSIS — M76.60 ACHILLES TENDON PAIN: Primary | ICD-10-CM

## 2025-02-03 PROCEDURE — 97140 MANUAL THERAPY 1/> REGIONS: CPT

## 2025-02-03 PROCEDURE — 97110 THERAPEUTIC EXERCISES: CPT

## 2025-02-03 NOTE — PROGRESS NOTES
"Daily Note     Today's date: 2/3/2025  Patient name: Mercy Sandy  : 2000  MRN: [id number]  Referring provider: Ovidio Kwon DPM  Dx:   Encounter Diagnosis     ICD-10-CM    1. Achilles tendon pain  M76.60       2. Acute right ankle pain  M25.571                      Subjective: Pt reports his R achilles and bottom of heel are sore.       Objective: See treatment diary below      Assessment: Pt arrived without CAM boot. Tolerated treatment well. Initiated exercises as outlined in POC. Performed IASTM to gastroc/achilles, but patient could not tolerate to bottom of foot, ticklish. Patient demonstrated fatigue post treatment, exhibited good technique with therapeutic exercises, and would benefit from continued PT, progressing as tolerated.      Plan: Continue per plan of care.      Precautions: none  CO-MORBIDITIES: hx of finger fracture  HEP ACCESS CODE: [id number]  (Updated: 25)  FOTO Completed On: 25    POC Expires Reeval for Medicare to be completed Re-eval last completed on Unit Limit Auth Start Date Auth Expiration Date PT/OT/ST  Visit Limit   3-28-25 By visit  10  unknown NA NA 90                         TREATMENT DIARY  Auth Status DATE  (IE) 2/3           NA Visit # 1 2            Remaining 9 8           MANUAL THERAPY             IASTM to R gastroc/achilles NV MJC           IASTM to R plantar fascia NV Pt did not tolerate                                                               THERAPEUTIC EXERCISE             NuStep to improve LE strength/endurance 10 mins, L4 NV 10 min  L 4                         Standing:             - SB gastroc stretch 4 x 30\" NV 4 x30\"           - SB soleus stretch 4 x 30\" NV 4 x30\"                        Seated:             - plantar fascia stretch with towel in long sit 4 x 30\" NV 4 x30\"            - great toe extension stretch 4 x 30\" NV NV           - TB ankle 4 way 2 x 10 ea GTB NV 2x10 ea GTB                        Table:             - Bridges " "with TB around knees 20 x 5\" RTB NV 20 x5\"  RTB           - SL hip ER with TB 20 x 5\" ea RTB NV 20 x5\" ea                                     NEUROMUSCULAR REEDUCATION              Standing:             - BIODEX:                  - LOS NV Static x1  Med dif                - Maze NV NV                                                                                                                                             THERAPEUTIC ACTIVITY             Front Step ups 2 x 10 6\" step NV            Lateral step ups 2 x 10 6\" step NV            Mini squats 2 x 10 NV            Heel raises 2 x 10 NV 2x10           Toe raises 2 x 10 NV 2x10                                                                                                                                                           GAIT TRAINING                                                                              MODALITIES             Cryo as needed                                   "

## 2025-02-10 ENCOUNTER — OFFICE VISIT (OUTPATIENT)
Dept: PHYSICAL THERAPY | Facility: CLINIC | Age: 25
End: 2025-02-10
Payer: COMMERCIAL

## 2025-02-10 DIAGNOSIS — M25.571 ACUTE RIGHT ANKLE PAIN: ICD-10-CM

## 2025-02-10 DIAGNOSIS — M76.60 ACHILLES TENDON PAIN: Primary | ICD-10-CM

## 2025-02-10 PROCEDURE — 97110 THERAPEUTIC EXERCISES: CPT | Performed by: PHYSICAL THERAPIST

## 2025-02-10 PROCEDURE — 97140 MANUAL THERAPY 1/> REGIONS: CPT | Performed by: PHYSICAL THERAPIST

## 2025-02-10 PROCEDURE — 97112 NEUROMUSCULAR REEDUCATION: CPT | Performed by: PHYSICAL THERAPIST

## 2025-02-10 NOTE — PROGRESS NOTES
"Daily Note     Today's date: 2/10/2025  Patient name: Mercy Sandy  : 2000  MRN: [id number]  Referring provider: Ovidio Kwon DPM  Dx:   Encounter Diagnosis     ICD-10-CM    1. Achilles tendon pain  M76.60       2. Acute right ankle pain  M25.571                      Subjective: Patient reports no increase in pain/discomfort following previous session.       Objective: See treatment diary below      Assessment: Progressed exercises this session as charted to enhance functional strength and endurance.  Tolerated treatment well. Patient demonstrated fatigue post treatment, exhibited good technique with therapeutic exercises, and would benefit from continued PT      Plan: Progress treatment as tolerated.       Precautions: none  CO-MORBIDITIES: hx of finger fracture  HEP ACCESS CODE: [id number]  (Updated: 25)  FOTO Completed On: 25    POC Expires Reeval for Medicare to be completed Re-eval last completed on Unit Limit Auth Start Date Auth Expiration Date PT/OT/ST  Visit Limit   3-28-25 By visit  10  unknown NA NA 90                         TREATMENT DIARY  Auth Status DATE  (IE) 2/3 2-10          NA Visit # 1 2 3           Remaining 9 8 7          MANUAL THERAPY             IASTM to R gastroc/achilles NV MJC JG          IASTM to R plantar fascia NV Pt did not tolerate                                                               THERAPEUTIC EXERCISE             NuStep to improve LE strength/endurance 10 mins, L4 NV 10 min  L 4  10 mins, L5                       Standing:             - SB gastroc stretch 4 x 30\" NV 4 x30\" 4 x30\"          - SB soleus stretch 4 x 30\" NV 4 x30\" 4 x30\"                       Seated:             - plantar fascia stretch with towel in long sit 4 x 30\" NV 4 x30\"  4 x 30\"          - great toe extension stretch 4 x 30\" NV NV 4 x 30\"          - TB ankle 4 way 2 x 10 ea GTB NV 2x10 ea GTB 2x10 ea GTB                       Table:             - Bridges with TB around " "knees 20 x 5\" RTB NV 20 x5\"  RTB 20 x 5\"          - SL hip ER with TB 20 x 5\" ea RTB NV 20 x5\" ea 10 x 5\" ea RTB                                    NEUROMUSCULAR REEDUCATION              Standing:             - BIODEX:                  - LOS NV Static x1  Med dif Static, Med difficulty  Trial 1: 24%  Trial 2: 59%  Trial 3: 71%               - Maze NV NV Static, Med difficulty  Trial 1: 60%  Trial 2: 28%  Trial 3: %                                                                                                                                            THERAPEUTIC ACTIVITY             Front Step ups 2 x 10 6\" step NV  NV          Lateral step ups 2 x 10 6\" step NV  NV          Mini squats 2 x 10 NV  NV          Heel raises 2 x 10 NV 2x10 2 x 10          Toe raises 2 x 10 NV 2x10  2 x 10                                                                                                                                                         GAIT TRAINING                                                                              MODALITIES             Cryo as needed                                       "

## 2025-02-12 ENCOUNTER — OFFICE VISIT (OUTPATIENT)
Dept: PHYSICAL THERAPY | Facility: CLINIC | Age: 25
End: 2025-02-12
Payer: COMMERCIAL

## 2025-02-12 DIAGNOSIS — M25.571 ACUTE RIGHT ANKLE PAIN: ICD-10-CM

## 2025-02-12 DIAGNOSIS — M76.60 ACHILLES TENDON PAIN: Primary | ICD-10-CM

## 2025-02-12 PROCEDURE — 97110 THERAPEUTIC EXERCISES: CPT

## 2025-02-12 PROCEDURE — 97140 MANUAL THERAPY 1/> REGIONS: CPT

## 2025-02-12 PROCEDURE — 97112 NEUROMUSCULAR REEDUCATION: CPT

## 2025-02-12 NOTE — PROGRESS NOTES
"Daily Note     Today's date: 2025  Patient name: Mercy Sandy  : 2000  MRN: [id number]  Referring provider: Ovidio Kwon DPM  Dx:   Encounter Diagnosis     ICD-10-CM    1. Achilles tendon pain  M76.60       2. Acute right ankle pain  M25.571                      Subjective: Pt c/o R heel pain. Denies any adverse effects following last treatment.       Objective: See treatment diary below      Assessment: Tolerated treatment well. Patient demonstrated fatigue post treatment, exhibited good technique with therapeutic exercises, and would benefit from continued PT      Plan: Continue per plan of care.      Precautions: none  CO-MORBIDITIES: hx of finger fracture  HEP ACCESS CODE: [id number]  (Updated: 25)  FOTO Completed On: 25    POC Expires Reeval for Medicare to be completed Re-eval last completed on Unit Limit Auth Start Date Auth Expiration Date PT/OT/ST  Visit Limit   3-28-25 By visit  10  unknown NA NA 90                         TREATMENT DIARY  Auth Status DATE  (IE) 2/3 2-10 2/12         NA Visit # 1 2 3 4          Remaining 9 8 7 6         MANUAL THERAPY             IASTM to R gastroc/achilles NV MJC JG MJC         IASTM to R plantar fascia NV Pt did not tolerate                                                               THERAPEUTIC EXERCISE             NuStep to improve LE strength/endurance 10 mins, L4 NV 10 min  L 4  10 mins, L5  10 mins, L5                      Standing:             - SB gastroc stretch 4 x 30\" NV 4 x30\" 4 x30\" 4 x30\"         - SB soleus stretch 4 x 30\" NV 4 x30\" 4 x30\" 4 x30\"                      Seated:             - plantar fascia stretch with towel in long sit 4 x 30\" NV 4 x30\"  4 x 30\" 4 x 30\"         - great toe extension stretch 4 x 30\" NV NV 4 x 30\" 4 x30\"         - TB ankle 4 way 2 x 10 ea GTB NV 2x10 ea GTB 2x10 ea GTB 2x10 ea GTB                      Table:             - Bridges with TB around knees 20 x 5\" RTB NV 20 x5\"  RTB 20 x 5\" 20 x5\"  " "       - SL hip ER with TB 20 x 5\" ea RTB NV 20 x5\" ea 10 x 5\" ea RTB 15 x5\"  RTB                                   NEUROMUSCULAR REEDUCATION              Standing:             - BIODEX:                  - LOS NV Static x1  Med dif Static, Med difficulty  Trial 1: 24%  Trial 2: 59%  Trial 3: 71% Static, med   Difficulty  Trial 1: 66%  Trial 2: 60%  Trial 3:72%              - Maze NV NV Static, Med difficulty  Trial 1: 60%  Trial 2: 28%  Trial 3: % Static, Med difficulty  Trial 1: 62%  Trial 2: 76%  Trial 3: 62%                                                                                                                                           THERAPEUTIC ACTIVITY             Front Step ups 2 x 10 6\" step NV  NV NV         Lateral step ups 2 x 10 6\" step NV  NV NV         Mini squats 2 x 10 NV  NV NV         Heel raises 2 x 10 NV 2x10 2 x 10 2x10         Toe raises 2 x 10 NV 2x10  2 x 10 2x10                                                                                                                                                        GAIT TRAINING                                                                              MODALITIES             Cryo as needed                                         "

## 2025-02-17 ENCOUNTER — OFFICE VISIT (OUTPATIENT)
Dept: PHYSICAL THERAPY | Facility: CLINIC | Age: 25
End: 2025-02-17
Payer: COMMERCIAL

## 2025-02-17 DIAGNOSIS — M25.571 ACUTE RIGHT ANKLE PAIN: ICD-10-CM

## 2025-02-17 DIAGNOSIS — M76.60 ACHILLES TENDON PAIN: Primary | ICD-10-CM

## 2025-02-17 PROCEDURE — 97530 THERAPEUTIC ACTIVITIES: CPT

## 2025-02-17 PROCEDURE — 97140 MANUAL THERAPY 1/> REGIONS: CPT

## 2025-02-17 PROCEDURE — 97112 NEUROMUSCULAR REEDUCATION: CPT

## 2025-02-17 PROCEDURE — 97110 THERAPEUTIC EXERCISES: CPT

## 2025-02-17 NOTE — PROGRESS NOTES
"Daily Note     Today's date: 2025  Patient name: Mercy Sandy  : 2000  MRN: [id number]  Referring provider: Ovidio Kwon DPM  Dx:   Encounter Diagnosis     ICD-10-CM    1. Achilles tendon pain  M76.60       2. Acute right ankle pain  M25.571                      Subjective: Pt reported pain near the insertion of the R achillis prior to PT.       Objective: See treatment diary below      Assessment: Tolerated treatment well. No adverse affect to STM post Tx. Patient demonstrated fatigue post treatment, exhibited good technique with therapeutic exercises, and would benefit from continued PT      Plan: Continue per plan of care.  Progress treatment as tolerated.       Precautions: none  CO-MORBIDITIES: hx of finger fracture  HEP ACCESS CODE: [id number]  (Updated: 25)  FOTO Completed On: 25    POC Expires Reeval for Medicare to be completed Re-eval last completed on Unit Limit Auth Start Date Auth Expiration Date PT/OT/ST  Visit Limit   3-28-25 By visit  10  unknown NA NA 90                         TREATMENT DIARY  Auth Status DATE  (IE) 2/3 2-10 2/12 2/17        NA Visit # 1 2 3 4 5         Remaining 9 8 7 6 5        MANUAL THERAPY             IASTM to R gastroc/achilles NV MJC JG MJC         IASTM to R plantar fascia NV Pt did not tolerate           STM w/ theragun to R gastroc/achilles      TM                                               THERAPEUTIC EXERCISE             NuStep to improve LE strength/endurance 10 mins, L4 NV 10 min  L 4  10 mins, L5  10 mins, L5         Upright bike to improve LE strength/endurance     10 mins, L5        Standing:             - SB gastroc stretch 4 x 30\" NV 4 x30\" 4 x30\" 4 x30\" 4 x30\"        - SB soleus stretch 4 x 30\" NV 4 x30\" 4 x30\" 4 x30\" 4 x30\"                      Seated:             - plantar fascia stretch with towel in long sit 4 x 30\" NV 4 x30\"  4 x 30\" 4 x 30\" 4 x 30\"        - great toe extension stretch 4 x 30\" NV NV 4 x 30\" 4 x30\" 4 x " "30\"        - TB ankle 4 way 2 x 10 ea GTB NV 2x10 ea GTB 2x10 ea GTB 2x10 ea GTB 2x10 ea GTB                     Table:             - Bridges with TB around knees 20 x 5\" RTB NV 20 x5\"  RTB 20 x 5\" 20 x5\" 5\" x20 w/ GTB        - SL hip ER with TB 20 x 5\" ea RTB NV 20 x5\" ea 10 x 5\" ea RTB 15 x5\"  RTB 5\" x20 w/ GTB                     -HR w/ eccentric lowering on leg press     130# 2x10        NEUROMUSCULAR REEDUCATION              Standing:             - BIODEX:                  - LOS NV Static x1  Med dif Static, Med difficulty  Trial 1: 24%  Trial 2: 59%  Trial 3: 71% Static, med   Difficulty  Trial 1: 66%  Trial 2: 60%  Trial 3:72% Static, med   Difficulty  Trial 1: 68%  Trial 2: 66%  Trial 3:84%             - Maze NV NV Static, Med difficulty  Trial 1: 60%  Trial 2: 28%  Trial 3: % Static, Med difficulty  Trial 1: 62%  Trial 2: 76%  Trial 3: 62% Static, Med difficulty  Trial 1: 57%  Trial 2: 66%  Trial 3: 70%                                                                                                                                          THERAPEUTIC ACTIVITY             Front Step ups 2 x 10 6\" step NV  NV NV Up/over on RLE 6\" step x20        Lateral step ups 2 x 10 6\" step NV  NV NV         Mini squats 2 x 10 NV  NV NV         Heel raises 2 x 10 NV 2x10 2 x 10 2x10 x20        Toe raises 2 x 10 NV 2x10  2 x 10 2x10 x20                     Self STM to insertion of achilles w/ golf ball     X3 min                                                                                                                             GAIT TRAINING                                                                              MODALITIES             Cryo as needed                                           "

## 2025-02-19 ENCOUNTER — OFFICE VISIT (OUTPATIENT)
Dept: PHYSICAL THERAPY | Facility: CLINIC | Age: 25
End: 2025-02-19
Payer: COMMERCIAL

## 2025-02-19 DIAGNOSIS — M76.60 ACHILLES TENDON PAIN: Primary | ICD-10-CM

## 2025-02-19 DIAGNOSIS — M25.571 ACUTE RIGHT ANKLE PAIN: ICD-10-CM

## 2025-02-19 PROCEDURE — 97140 MANUAL THERAPY 1/> REGIONS: CPT

## 2025-02-19 PROCEDURE — 97112 NEUROMUSCULAR REEDUCATION: CPT

## 2025-02-19 PROCEDURE — 97110 THERAPEUTIC EXERCISES: CPT

## 2025-02-19 NOTE — PROGRESS NOTES
"Daily Note     Today's date: 2025  Patient name: Mercy Sandy  : 2000  MRN: [id number]  Referring provider: Ovidio Kwon DPM  Dx:   Encounter Diagnosis     ICD-10-CM    1. Achilles tendon pain  M76.60       2. Acute right ankle pain  M25.571                      Subjective: Pt reported soreness around his R heel prior to PT. He did perform increased walking around the mall on Monday after his PT Tx.       Objective: See treatment diary below      Assessment: Tolerated treatment well. No adverse affect to STM post Tx. Patient demonstrated fatigue post treatment, exhibited good technique with therapeutic exercises, and would benefit from continued PT      Plan: Continue per plan of care.  Progress treatment as tolerated.       Precautions: none  CO-MORBIDITIES: hx of finger fracture  HEP ACCESS CODE: [id number]  (Updated: 25)  FOTO Completed On: 25    POC Expires Reeval for Medicare to be completed Re-eval last completed on Unit Limit Auth Start Date Auth Expiration Date PT/OT/ST  Visit Limit   3-28-25 By visit  10  unknown NA NA 90                         TREATMENT DIARY  Auth Status DATE  (IE) 2/3 2-10 2/12 2/17 2/19       NA Visit # 1 2 3 4 5 6        Remaining 9 8 7 6 5 4       MANUAL THERAPY             IASTM to R gastroc/achilles NV MJC JG MJC         IASTM to R plantar fascia NV Pt did not tolerate           STM w/ theragun to R gastroc/achilles      TM TM                                              THERAPEUTIC EXERCISE             NuStep to improve LE strength/endurance 10 mins, L4 NV 10 min  L 4  10 mins, L5  10 mins, L5         Upright bike to improve LE strength/endurance     10 mins, L5 10 mins, L5       Standing:             - SB gastroc stretch 4 x 30\" NV 4 x30\" 4 x30\" 4 x30\" 4 x30\" 4 x30\"       - SB soleus stretch 4 x 30\" NV 4 x30\" 4 x30\" 4 x30\" 4 x30\"  4 x30\"                    Seated:             - plantar fascia stretch with towel in long sit 4 x 30\" NV 4 x30\"  4 x " "30\" 4 x 30\" 4 x 30\" 4 x 30\"       - great toe extension stretch 4 x 30\" NV NV 4 x 30\" 4 x30\" 4 x 30\" 4 x 30\"       - TB ankle 4 way 2 x 10 ea GTB NV 2x10 ea GTB 2x10 ea GTB 2x10 ea GTB 2x10 ea GTB 2x10 ea GTB                    Table:             - Bridges with TB around knees 20 x 5\" RTB NV 20 x5\"  RTB 20 x 5\" 20 x5\" 5\" x20 w/ GTB 5\" x20 w/ GTB       - SL hip ER with TB 20 x 5\" ea RTB NV 20 x5\" ea 10 x 5\" ea RTB 15 x5\"  RTB 5\" x20 w/ GTB 5\" x20 w/ GTB                    - HR w/ eccentric lowering on leg press     130# 2x10 130# 2x10       NEUROMUSCULAR REEDUCATION              Standing:             - BIODEX:                  - LOS NV Static x1  Med dif Static, Med difficulty  Trial 1: 24%  Trial 2: 59%  Trial 3: 71% Static, med   Difficulty  Trial 1: 66%  Trial 2: 60%  Trial 3:72% Static, med   Difficulty  Trial 1: 68%  Trial 2: 66%  Trial 3:84% Static, med   Difficulty  Trial 1: 67%  Trial 2: 6%  Trial 3:83%            - Maze NV NV Static, Med difficulty  Trial 1: 60%  Trial 2: 28%  Trial 3: % Static, Med difficulty  Trial 1: 62%  Trial 2: 76%  Trial 3: 62% Static, Med difficulty  Trial 1: 57%  Trial 2: 66%  Trial 3: 70% Static, Med difficulty  Trial 1: 64%  Trial 2: 76%  Trial 3: 62%                                                                                                                                         THERAPEUTIC ACTIVITY             Front Step ups 2 x 10 6\" step NV  NV NV Up/over on RLE 6\" step x20 Up/over on RLE 6\" step x20       Lateral step ups 2 x 10 6\" step NV  NV NV         Mini squats 2 x 10 NV  NV NV         Heel raises 2 x 10 NV 2x10 2 x 10 2x10 x20 x20       Toe raises 2 x 10 NV 2x10  2 x 10 2x10 x20 x20                    Self STM to insertion of achilles and plantar fascia w/ golf ball     X3 min X3 min                                                                                                                            GAIT TRAINING                                                 "                              MODALITIES             Cryo as needed

## 2025-02-20 ENCOUNTER — OFFICE VISIT (OUTPATIENT)
Age: 25
End: 2025-02-20
Payer: COMMERCIAL

## 2025-02-20 VITALS — HEIGHT: 77 IN | BODY MASS INDEX: 32.59 KG/M2 | WEIGHT: 276 LBS

## 2025-02-20 DIAGNOSIS — M79.671 RIGHT FOOT PAIN: ICD-10-CM

## 2025-02-20 DIAGNOSIS — M92.61 HAGLUND DEFORMITY OF RIGHT HEEL: ICD-10-CM

## 2025-02-20 DIAGNOSIS — M76.60 ACHILLES TENDON PAIN: Primary | ICD-10-CM

## 2025-02-20 PROCEDURE — 99214 OFFICE O/P EST MOD 30 MIN: CPT

## 2025-02-20 NOTE — PROGRESS NOTES
Name: Mercy Sandy      : 2000      MRN: [id number]  Encounter Provider: Ovidio Kwon DPM  Encounter Date: 2025   Encounter department: [facility] [place] [facility]  :  Assessment & Plan  Achilles tendon pain    Orders:    XR heel / calcaneus 2+ vw right; Future    Right foot pain    Orders:    XR heel / calcaneus 2+ vw right; Future    Indira deformity of right heel         Painful Achilles Tendonitis, Indira's deformity treatment:    Rest, Ice, Compression, Elevate foot.  Anti-inflammatory medications (if you are able to take)  Supportive sneakers, consider open heel shoes (Kuru or similar brand)  Over-the-counter orthotics  Continue Home exercises  Continue formal physical therapy   MRI reviewed Achilles tendon intact  Topical Voltaren gel over-the-counter can use up to 4 times daily    If no improvement:  Immobilization in boot      If conservative treatment fails:  Surgery    I personally discussed with patient at the visit today:     The diagnosis of this encounter  2.   Possible etiologies of the diagnosis  3.   Treatment options including advantages and disadvantages of treatment with potential risks and complications associated with these treatments  4.   Prevention strategies and ways to decrease pain     I answered all of the patients questions.      -X-ray from today reviewed of right calcaneus: No acute osseous abnormality to suggest fracture. I note no calcifications within the Achilles tendon near the insertion. I note a calcaneal inclination angle of 27 degrees. Garcia-radhames angle 50 degrees. Moderate prominence noted at the  lateral calcaneus. This is representative of a Indira's deformity.      History of Present Illness   HPI  Mercy Sandy is a 24 y.o. male who presents for follow-up of right heel pain.  Patient has been doing formal physical therapy for the past 4 weeks and has reported increased in right heel pain.  He states that he has been doing  "stretching daily at home as well and taking Tylenol for pain.     DOI 1/10/2024 lounging motion at work    History obtained from: patient    Review of Systems  Current Outpatient Medications on File Prior to Visit   Medication Sig Dispense Refill    brompheniramine-pseudoephedrine-DM 30-2-10 MG/5ML syrup Take 5 mL by mouth 4 (four) times a day as needed for allergies, congestion or cough (Patient not taking: Reported on 1/13/2025) 120 mL 0    famotidine (PEPCID) 20 mg tablet Take 1 tablet (20 mg total) by mouth 2 (two) times a day (Patient not taking: Reported on 1/13/2025) 90 tablet 3    ondansetron (ZOFRAN) 4 mg tablet Take 1 tablet (4 mg total) by mouth every 8 (eight) hours as needed for nausea or vomiting (Patient not taking: Reported on 9/30/2024) 20 tablet 0    pantoprazole (PROTONIX) 40 mg tablet Take 1 tablet (40 mg total) by mouth 2 (two) times a day (Patient not taking: Reported on 1/23/2025) 30 tablet 3     No current facility-administered medications on file prior to visit.         Objective   Ht 6' 5\" (1.956 m)   Wt 125 kg (276 lb)   BMI 32.73 kg/m²      Physical Exam  Vascular: Intact pedal pulses bilateral DP and PT.  Neurological: Gross protective sensation intact bilateral  Musculoskeletal: Muscle strength bilateral intact with dorsiflexion, inversion, eversion and plantarflexion.  Minimal tenderness on palpation at the insertion point of the Achilles tendon. There is prominence noted on the posterior lateral aspect of the heel.  Some mild swelling noted to this area. There is intact muscle strength on plantarflexion.  There is tenderness on maximal dorsiflexion at the level of the ankle at the insertion point of the Achilles tendon.  There is some mild swelling is noted around the Achilles tendon insertion point.  No palpable dell in the Achilles tendon.  Dermatological: No open lesions or ulcerations noted bilateral.          "

## 2025-02-24 ENCOUNTER — OFFICE VISIT (OUTPATIENT)
Dept: PHYSICAL THERAPY | Facility: CLINIC | Age: 25
End: 2025-02-24
Payer: COMMERCIAL

## 2025-02-24 ENCOUNTER — TELEPHONE (OUTPATIENT)
Age: 25
End: 2025-02-24

## 2025-02-24 DIAGNOSIS — M76.60 ACHILLES TENDON PAIN: Primary | ICD-10-CM

## 2025-02-24 DIAGNOSIS — M25.571 ACUTE RIGHT ANKLE PAIN: ICD-10-CM

## 2025-02-24 PROCEDURE — 97112 NEUROMUSCULAR REEDUCATION: CPT

## 2025-02-24 PROCEDURE — 97110 THERAPEUTIC EXERCISES: CPT

## 2025-02-24 PROCEDURE — 97140 MANUAL THERAPY 1/> REGIONS: CPT

## 2025-02-24 NOTE — TELEPHONE ENCOUNTER
Caller: Mercy     Doctor and/or Office: Dr. Kwon     #: [phone]     Escalation: Disability forms Patient called in and asked if there is anyway his forms could be done by Wed that is his return to date work and also asked if you could please call him when they are done.  Thank you

## 2025-02-24 NOTE — PROGRESS NOTES
"Daily Note     Today's date: 2025  Patient name: Mercy Sandy  : 2000  MRN: [id number]  Referring provider: Ovidio Kwon DPM  Dx:   Encounter Diagnosis     ICD-10-CM    1. Achilles tendon pain  M76.60       2. Acute right ankle pain  M25.571                      Subjective: Pt is supposed to return to work on Wednesday, 25. Pt reported that he had a f/u with podiatry. They did find a bone spur on his R heel. He has a surgery consult on 3/11/25.      Objective: See treatment diary below      Assessment: Tolerated treatment well. Patient demonstrated fatigue post treatment, exhibited good technique with therapeutic exercises, and would benefit from continued PT      Plan: Continue per plan of care.  Progress treatment as tolerated.       Precautions: none  CO-MORBIDITIES: hx of finger fracture  HEP ACCESS CODE: [id number]  (Updated: 25)  FOTO Completed On: 25    POC Expires Reeval for Medicare to be completed Re-eval last completed on Unit Limit Auth Start Date Auth Expiration Date PT/OT/ST  Visit Limit   3-28-25 By visit  10  unknown NA NA 90                         TREATMENT DIARY  Auth Status DATE  (IE) 2/3 2-10 2/12 2/17 2/19 2/24      NA Visit # 1 2 3 4 5 6 7       Remaining 9 8 7 6 5 4 3      MANUAL THERAPY             IASTM to R gastroc/achilles NV MJC JG MJC         IASTM to R plantar fascia NV Pt did not tolerate           STM w/ theragun to R gastroc/achilles      TM TM TM                                             THERAPEUTIC EXERCISE             NuStep to improve LE strength/endurance 10 mins, L4 NV 10 min  L 4  10 mins, L5  10 mins, L5         Upright bike to improve LE strength/endurance     10 mins, L5 10 mins, L5 10 mins, L5      Standing:             - SB gastroc stretch 4 x 30\" NV 4 x30\" 4 x30\" 4 x30\" 4 x30\" 4 x30\" 4 x30\"      - SB soleus stretch 4 x 30\" NV 4 x30\" 4 x30\" 4 x30\" 4 x30\"  4 x30\" 4 x30\"                   Seated:             - plantar fascia stretch " "with towel in long sit 4 x 30\" NV 4 x30\"  4 x 30\" 4 x 30\" 4 x 30\" 4 x 30\" 4 x30\"      - great toe extension stretch 4 x 30\" NV NV 4 x 30\" 4 x30\" 4 x 30\" 4 x 30\" 4 x30\"      - TB ankle 4 way 2 x 10 ea GTB NV 2x10 ea GTB 2x10 ea GTB 2x10 ea GTB 2x10 ea GTB 2x10 ea GTB 2x10 ea GTB                   Table:             - Bridges with TB around knees 20 x 5\" RTB NV 20 x5\"  RTB 20 x 5\" 20 x5\" 5\" x20 w/ GTB 5\" x20 w/ GTB 5\" x20 w/ GTB      - SL hip ER with TB 20 x 5\" ea RTB NV 20 x5\" ea 10 x 5\" ea RTB 15 x5\"  RTB 5\" x20 w/ GTB 5\" x20 w/ GTB 5\" x20 w/ GTB                   - HR w/ eccentric lowering on leg press     130# 2x10 130# 2x10 130# 2x10      NEUROMUSCULAR REEDUCATION              Standing:             - BIODEX:                  - LOS NV Static x1  Med dif Static, Med difficulty  Trial 1: 24%  Trial 2: 59%  Trial 3: 71% Static, med   Difficulty  Trial 1: 66%  Trial 2: 60%  Trial 3:72% Static, med   Difficulty  Trial 1: 68%  Trial 2: 66%  Trial 3:84% Static, med   Difficulty  Trial 1: 67%  Trial 2: 64%  Trial 3:83% Static, med   Difficulty  Trial 1: 78%  Trial 2: 77%  Trial 3: 83%           - Maze NV NV Static, Med difficulty  Trial 1: 60%  Trial 2: 28%  Trial 3: % Static, Med difficulty  Trial 1: 62%  Trial 2: 76%  Trial 3: 62% Static, Med difficulty  Trial 1: 57%  Trial 2: 66%  Trial 3: 70% Static, Med difficulty  Trial 1: 64%  Trial 2: 76%  Trial 3: 62% L12, med   Difficulty  Trial 1: 86%  Trial 2: 90%  Trial 3: 94%                                                                                                                                        THERAPEUTIC ACTIVITY             Front Step ups 2 x 10 6\" step NV  NV NV Up/over on RLE 6\" step x20 Up/over on RLE 6\" step x20 Up/over on RLE 6\" step x20      Lateral step ups 2 x 10 6\" step NV  NV NV         Mini squats 2 x 10 NV  NV NV         Heel raises 2 x 10 NV 2x10 2 x 10 2x10 x20 x20 x20      Toe raises 2 x 10 NV 2x10  2 x 10 2x10 x20 x20 x20                 "   Self STM to insertion of achilles and plantar fascia w/ golf ball     X3 min X3 min held                                                                                                                           GAIT TRAINING                                                                              MODALITIES             Cryo as needed

## 2025-02-25 ENCOUNTER — TELEPHONE (OUTPATIENT)
Dept: PODIATRY | Facility: CLINIC | Age: 25
End: 2025-02-25

## 2025-02-25 NOTE — TELEPHONE ENCOUNTER
Called to let patient know his paperwork was faxed over and they did receive it but it is just not completed yet by our leave team. Did also let patient know I did place a note in his MyChart that he can return to work.

## 2025-02-25 NOTE — TELEPHONE ENCOUNTER
Caller: Baudilio Sandy    Doctor/Office: Dr Ovidio Kwon    #: [phone]      What needs to be faxed: Completed short term disability paperwork.  He is scheduled to return to work tomorrow.  They will not accept him back without it.    ATTN to: [facility]  Leave ID#[id number]    Fax#: [phone]    Please Note:  Can someone in the office please call Baudilio so he knows if he can return to work tomorrow or if he has to take a personal day.    Thank you,

## 2025-02-26 ENCOUNTER — APPOINTMENT (OUTPATIENT)
Dept: PHYSICAL THERAPY | Facility: CLINIC | Age: 25
End: 2025-02-26
Payer: COMMERCIAL

## 2025-02-27 ENCOUNTER — APPOINTMENT (OUTPATIENT)
Dept: PHYSICAL THERAPY | Facility: CLINIC | Age: 25
End: 2025-02-27
Payer: COMMERCIAL

## 2025-03-27 ENCOUNTER — OFFICE VISIT (OUTPATIENT)
Age: 25
End: 2025-03-27
Payer: COMMERCIAL

## 2025-03-27 VITALS — HEIGHT: 77 IN | BODY MASS INDEX: 32.59 KG/M2 | WEIGHT: 276 LBS

## 2025-03-27 DIAGNOSIS — Z01.818 PRE-OPERATIVE CLEARANCE: ICD-10-CM

## 2025-03-27 DIAGNOSIS — M92.61 HAGLUND'S DEFORMITY OF RIGHT HEEL: Primary | ICD-10-CM

## 2025-03-27 PROCEDURE — 99214 OFFICE O/P EST MOD 30 MIN: CPT

## 2025-03-27 RX ORDER — CHLORHEXIDINE GLUCONATE 40 MG/ML
SOLUTION TOPICAL DAILY PRN
OUTPATIENT
Start: 2025-03-27

## 2025-03-27 RX ORDER — CHLORHEXIDINE GLUCONATE ORAL RINSE 1.2 MG/ML
15 SOLUTION DENTAL ONCE
OUTPATIENT
Start: 2025-03-27 | End: 2025-03-27

## 2025-03-27 NOTE — PROGRESS NOTES
Name: Mercy Sandy      : 2000      MRN: [id number]  Encounter Provider: Ovidio Kwon DPM  Encounter Date: 3/27/2025   [facility]  :  Assessment & Plan  Indira's deformity of right heel    Orders:    Case request operating room: EXCISION EXOSTOSIS, correction Hagl[place] deformity, detachment and reattachment of Achilles tendon; Standing    Pre-operative clearance    Orders:    Basic metabolic panel; Future    CBC and Platelet; Future      -Patient was educated regarding their condition.  -We discussed conservative vs surgical options for the patient's posterior heel pain. I was very clear at the beginning of the discussion about alternatives to this surgery including benign neglect, bracing, and alternative surgical opinions. I spent time to discuss with the patient the surgical procedure(s) as removal of painful calcaneal exostosis with repair of achilles tendon, pre-op testing, and post-op course (Posterior splint 2 weeks, transition to CAM boot)required to properly heal the surgery. I discussed risks as infection, scar, swelling, chronic pain, painful or prominent hardware, possible need to remove hardware, poor healing of incision or bone that could require more surgery, incomplete correction of deformities or recurrence of deformities, change in shape of foot, toe, or walking and function, numbness, neuritis/RSD, blood clots in the leg or lung, and even death from anesthesia complications. No guarantees were given and the possibility of recurrent deformity or incomplete correction were discussed before patient signed the consent form. We also discussed the need for possible anticoagulation. The offloading device necessary after the surgery will be posterior splint and crutches. The surgery, history and physical and PATS will be scheduled.   -continue open heel shoes when possible  -RTC as scheduled post-operatively    Painful Achilles  Tendonitis, Indira's deformity treatment:    Rest, Ice, Compression, Elevate foot.  Anti-inflammatory medications (if you are able to take)  Supportive sneakers, consider open heel shoes (Kuru or similar brand)  Over-the-counter orthotics  Continue Home exercises  Continue formal physical therapy   Topical Voltaren gel over-the-counter can use up to 4 times daily    If no improvement:  Immobilization in boot    If conservative treatment fails:  Surgery    I personally discussed with patient at the visit today:     The diagnosis of this encounter  2.   Possible etiologies of the diagnosis  3.   Treatment options including advantages and disadvantages of treatment with potential risks and complications associated with these treatments  4.   Prevention strategies and ways to decrease pain     I answered all of the patients questions.      Previously discussed:  -X-ray reviewed of right calcaneus: No acute osseous abnormality to suggest fracture. I note no calcifications within the Achilles tendon near the insertion. I note a calcaneal inclination angle of 27 degrees. Garcia-radhames angle 50 degrees. Moderate prominence noted at the  lateral calcaneus. This is representative of a Indira's deformity.      MRI reviewed Achilles tendon intact    History of Present Illness   HPI  Mercy Sandy is a 24 y.o. male who presents for follow up of right posterior heel pain. Patient reports formal physical therapy only worsened his symptoms. CAM boot has helped somewhat, but still having pain in heel. Hard to wear closed heeled shoes. Continues to stretch daily and takes anti-inflammatory medication as needed.    DOI 1/10/2024 lounging motion at work    History obtained from: patient    Review of Systems  Current Outpatient Medications on File Prior to Visit   Medication Sig Dispense Refill    brompheniramine-pseudoephedrine-DM 30-2-10 MG/5ML syrup Take 5 mL by mouth 4 (four) times a day as needed for allergies, congestion or  "cough (Patient not taking: Reported on 1/13/2025) 120 mL 0    famotidine (PEPCID) 20 mg tablet Take 1 tablet (20 mg total) by mouth 2 (two) times a day (Patient not taking: Reported on 1/13/2025) 90 tablet 3    ondansetron (ZOFRAN) 4 mg tablet Take 1 tablet (4 mg total) by mouth every 8 (eight) hours as needed for nausea or vomiting (Patient not taking: Reported on 9/30/2024) 20 tablet 0    pantoprazole (PROTONIX) 40 mg tablet Take 1 tablet (40 mg total) by mouth 2 (two) times a day (Patient not taking: Reported on 1/23/2025) 30 tablet 3     No current facility-administered medications on file prior to visit.         Objective   Ht 6' 5\" (1.956 m)   Wt 125 kg (276 lb)   BMI 32.73 kg/m²      Physical Exam      Vascular:  -DP and PT pulses intact b/l  -Capillary refill time <2 sec b/l  -Skin temp: WNL    MSK:  -Pain on palpation of posterior prominence near Achilles tendon insertion point. Pain on palpation anterior to the Achilles tendon, enlarged prominence posterior-lateral heel  -No gross deformities noted   -MMT is 5/5 to all muscle compartments of the lower extremity  -Ankle dorsiflexion >10 degrees with knee extended and knee flexed b/l  -Hammond test of RLE: negative    Biomechanical Exam of LE:    Standing exam: 1st MTPJ ROM WNL. Toes purchase floor adequately with no digital deformity noted. Medial arch height WNL. Resting calcaneal stance position about 2deg valgus. Heels invert with heel rise. Forefoot is in proper alignment compared to hindfoot in the transverse and sagittal planes. Tibiotalar joint is in rectus position with no lateral bowing of the Achilles tendon noted.     STJ ROM WNL b/l, heel inversion is approximately 20° on right and 20° on left; heel eversion is approximately 10° on right and 10° on left; neutral calcaneal stance position is 0°   1st ray ROM WNL b/l, able to dorsiflex/plantarflex b/l    Gait analysis: WNL with normal angle and base. At midstance the foot is neutral. Early heel " off right foot is present during ambulation    Gross deformity noted: neutral    Neuro:  -Light sensation intact bilaterally  -Protective sensation intact bilaterally with 10/10 points felt with [place] monofilament    Derm:  -No lesions, abrasions, or open wounds noted  -Erythema noted at posterior-lateral right heel  -No callus formation noted on exam

## 2025-04-02 ENCOUNTER — APPOINTMENT (OUTPATIENT)
Dept: LAB | Facility: CLINIC | Age: 25
End: 2025-04-02
Payer: COMMERCIAL

## 2025-04-02 ENCOUNTER — TELEPHONE (OUTPATIENT)
Dept: FAMILY MEDICINE CLINIC | Facility: CLINIC | Age: 25
End: 2025-04-02

## 2025-04-02 DIAGNOSIS — Z01.818 PRE-OPERATIVE CLEARANCE: ICD-10-CM

## 2025-04-02 LAB
ANION GAP SERPL CALCULATED.3IONS-SCNC: 8 MMOL/L (ref 4–13)
BUN SERPL-MCNC: 13 MG/DL (ref 5–25)
CALCIUM SERPL-MCNC: 9.4 MG/DL (ref 8.4–10.2)
CHLORIDE SERPL-SCNC: 103 MMOL/L (ref 96–108)
CO2 SERPL-SCNC: 29 MMOL/L (ref 21–32)
CREAT SERPL-MCNC: 0.85 MG/DL (ref 0.6–1.3)
ERYTHROCYTE [DISTWIDTH] IN BLOOD BY AUTOMATED COUNT: 13.4 % (ref 11.6–15.1)
GFR SERPL CREATININE-BSD FRML MDRD: 123 ML/MIN/1.73SQ M
GLUCOSE SERPL-MCNC: 117 MG/DL (ref 65–140)
HCT VFR BLD AUTO: 47 % (ref 36.5–49.3)
HGB BLD-MCNC: 15.1 G/DL (ref 12–17)
MCH RBC QN AUTO: 28.5 PG (ref 26.8–34.3)
MCHC RBC AUTO-ENTMCNC: 32.1 G/DL (ref 31.4–37.4)
MCV RBC AUTO: 89 FL (ref 82–98)
PLATELET # BLD AUTO: 244 THOUSANDS/UL (ref 149–390)
PMV BLD AUTO: 12.1 FL (ref 8.9–12.7)
POTASSIUM SERPL-SCNC: 4.1 MMOL/L (ref 3.5–5.3)
RBC # BLD AUTO: 5.3 MILLION/UL (ref 3.88–5.62)
SODIUM SERPL-SCNC: 140 MMOL/L (ref 135–147)
WBC # BLD AUTO: 5.8 THOUSAND/UL (ref 4.31–10.16)

## 2025-04-02 PROCEDURE — 80048 BASIC METABOLIC PNL TOTAL CA: CPT

## 2025-04-02 PROCEDURE — 36415 COLL VENOUS BLD VENIPUNCTURE: CPT

## 2025-04-02 PROCEDURE — 85027 COMPLETE CBC AUTOMATED: CPT

## 2025-04-02 NOTE — TELEPHONE ENCOUNTER
Patient has pre op tomorrow scheduled with Paty for 10:20. Surgeon had scheduled patient to get labs done. Called patient and left detailed voicemail asking him to complete these today.

## 2025-04-08 ENCOUNTER — CONSULT (OUTPATIENT)
Dept: FAMILY MEDICINE CLINIC | Facility: CLINIC | Age: 25
End: 2025-04-08
Payer: COMMERCIAL

## 2025-04-08 VITALS
DIASTOLIC BLOOD PRESSURE: 80 MMHG | HEIGHT: 77 IN | OXYGEN SATURATION: 98 % | WEIGHT: 273.8 LBS | RESPIRATION RATE: 18 BRPM | BODY MASS INDEX: 32.33 KG/M2 | TEMPERATURE: 98.4 F | SYSTOLIC BLOOD PRESSURE: 130 MMHG | HEART RATE: 66 BPM

## 2025-04-08 DIAGNOSIS — M76.60 ACHILLES TENDON PAIN: ICD-10-CM

## 2025-04-08 DIAGNOSIS — Z01.818 PRE-OP EXAMINATION: Primary | ICD-10-CM

## 2025-04-08 DIAGNOSIS — M92.61 HAGLUND'S DEFORMITY OF RIGHT HEEL: ICD-10-CM

## 2025-04-08 PROCEDURE — 99214 OFFICE O/P EST MOD 30 MIN: CPT | Performed by: NURSE PRACTITIONER

## 2025-04-08 NOTE — PROGRESS NOTES
Pre-operative Clearance  Name: Mercy Sandy      : 2000      MRN: [id number]  Encounter Provider: CHRISTIAN Colunga  Encounter Date: 2025   Encounter department: [facility]  :  Assessment & Plan  Pre-op examination         Achilles tendon pain         Indira's deformity of right heel         Pre-operative Clearance:     Revised Cardiac Risk Index:  RCI RISK CLASS I (0 risk factors, risk of major cardiac complications approximately 0.5%)    Clearance:  Patient is medically optimized (CLEARED) for proposed surgery without any additional cardiac testing.      Medication Instructions:   - Avoid herbs or non-directed vitamins one week prior to surgery    - Avoid aspirin containing medications or non-steroidal anti-inflammatory drugs one week preceding surgery    - May take tylenol for pain up until the night before surgery         History of Present Illness     Pre-op Exam  Surgery: EXCISION EXOSTOSIS, correction Haglunds deformity, detachment and reattachment of Achilles tendon (Right: Foot)  Anticipated Date of Surgery: 25  Surgeon: Ovidio Kwon DPM    Previous history of bleeding disorders or clots?: No  Previous Anesthesia reaction?: No  Prolonged steroid use in the last 6 months?: No    Assessment of Cardiac Risk:   - Unstable or severe angina or MI in the last 6 weeks or history of stent placement in the last year?: No   - Decompensated heart failure (e.g. New onset heart failure, NYHA  Class IV heart failure, or worsening existing heart failure)?: No  - Significant arrhythmias such as high grade AV block, symptomatic ventricular arrhythmia, newly recognized ventricular tachycardia, supraventricular tachycardia with resting heart rate >100, or symptomatic bradycardia?: No  - Severe heart valve disease including aortic stenosis or symptomatic mitral stenosis?: No      Pre-operative Risk Factors:  Elevated-risk surgery: No    History of cerebrovascular disease:  No    History of ischemic heart disease: No  Pre-operative treatment with insulin: No  Pre-operative creatinine >2 mg/dL: No    History of congestive heart failure: No    Review of Systems   Constitutional: Negative.  Negative for activity change, appetite change, chills, fatigue and fever.   HENT: Negative.  Negative for congestion, ear pain, nosebleeds, rhinorrhea and sore throat.    Eyes:  Negative for photophobia, pain, redness and visual disturbance.   Respiratory: Negative.  Negative for cough, shortness of breath and wheezing.    Cardiovascular: Negative.  Negative for chest pain.   Gastrointestinal: Negative.  Negative for abdominal pain, constipation, diarrhea and vomiting.   Endocrine: Negative.    Genitourinary:  Negative for difficulty urinating, dysuria and flank pain.   Musculoskeletal:  Positive for arthralgias (right heel pain).   Skin: Negative.  Negative for color change and rash.   Neurological: Negative.  Negative for dizziness, weakness, numbness and headaches.   Hematological:  Negative for adenopathy.   Psychiatric/Behavioral: Negative.  Negative for agitation and confusion. The patient is not nervous/anxious.      Past Medical History   Past Medical History:   Diagnosis Date   • GERD (gastroesophageal reflux disease)      Past Surgical History:   Procedure Laterality Date   • ORIF FINGER FRACTURE Left 02/2022    4th finger     Family History   Problem Relation Age of Onset   • No Known Problems Mother    • No Known Problems Father      Social History     Tobacco Use   • Smoking status: Never     Passive exposure: Current   • Smokeless tobacco: Never   Vaping Use   • Vaping status: Some Days   • Substances: Nicotine, THC, CBD, Flavoring   Substance and Sexual Activity   • Alcohol use: Yes   • Drug use: Not Currently     Types: Marijuana     Comment: vapes   • Sexual activity: Not on file     No current outpatient medications on file prior to visit.     Allergies   Allergen Reactions   •  "Omeprazole Itching     Objective   /80   Pulse 66   Temp 98.4 °F (36.9 °C)   Resp 18   Ht 6' 5\" (1.956 m)   Wt 124 kg (273 lb 12.8 oz)   SpO2 98%   BMI 32.47 kg/m²     Physical Exam  Vitals and nursing note reviewed.   Constitutional:       General: He is not in acute distress.     Appearance: He is well-developed. He is not diaphoretic.   HENT:      Right Ear: Tympanic membrane and ear canal normal.      Left Ear: Tympanic membrane and ear canal normal.      Nose: Nose normal. No congestion or rhinorrhea.   Cardiovascular:      Rate and Rhythm: Normal rate and regular rhythm.      Heart sounds: Normal heart sounds. No murmur heard.  Pulmonary:      Effort: Pulmonary effort is normal. No respiratory distress.      Breath sounds: Normal breath sounds. No wheezing or rales.   Abdominal:      General: Bowel sounds are normal. There is no distension.      Palpations: Abdomen is soft.      Tenderness: There is no abdominal tenderness.   Musculoskeletal:         General: Normal range of motion.   Skin:     General: Skin is warm and dry.      Capillary Refill: Capillary refill takes less than 2 seconds.      Findings: No erythema or rash.   Neurological:      General: No focal deficit present.      Mental Status: He is alert and oriented to person, place, and time.   Psychiatric:         Behavior: Behavior normal. Behavior is cooperative.         Thought Content: Thought content normal.           CHRISTIAN Colunga  "

## 2025-04-27 ENCOUNTER — ANESTHESIA EVENT (OUTPATIENT)
Dept: PERIOP | Facility: HOSPITAL | Age: 25
End: 2025-04-27
Payer: COMMERCIAL

## 2025-04-27 NOTE — DISCHARGE INSTR - AVS FIRST PAGE
Dr. Ovidio Kwon, DPM  Post-op surgery Instructions    Pain / Swelling  There is expected to be some discomfort, swelling and bruising of the foot. You might see some blood on the bandage. This is not a cause for alarm. However, if there is active or persistent bleeding (blood running out of the bandage while at rest) - call the office at once (or) go to a [facility] ER and ask them to page the podiatry residents.  Apply an ice bag to the top of your ankle for 30 minutes for each waking hour, for the first 72 hours. This should be discontinued when sleeping. This will also work through your cast if you have one. Ice must not leak and wet the dressings. Also, using the ice inappropriately can cause permanent nerve damage.  Your foot should be elevated as much as possible for the first 72 hours. The foot should be above heart level. If your foot is below heart level, throbbing and pain will increase.  When sleeping, elevation can be accomplished by putting a small hard suitcase between the box spring and mattress at the foot of the bed.  Walking and standing will increase pain, throbbing and bleeding.  Persistent pain despite elevation and your pain meds can many times be relieved by removing the tight brown compression layer (called the ACE wrap) that is over the white gauze dressing. If you are elevating and taking your pain meds and pain is still severe, remove this brown stretchy layer but leave the gauze intact. Wait 30 minutes. If the pain subsides, reapply the ACE so it’s not so tight. If pain doesn’t get better, call your doctor.   Dressings / Casts  Do not remove your surgical dressings - they will be changed at your doctor appointment. Do not allow surgical dressings to get wet. Sponge baths should be used until the sutures are removed.  Do not try to keep the foot dry using a garbage bag and tape - this rarely works.  If you get your dressings or cast wet - call your doctor immediately.  If your  cast or dressings feel tight - elevate your foot for 30 minutes. If this doesn’t help and you feel tingling or see toe discoloration - call your doctor or go to a [facility] ER and ask them to page the podiatry residents.   Do not put things in your cast such as powder, coat hangers to scratch, etc.. This can cause skin damage and infections.   Infection  If you have a fever at or above 100 degrees, chills, sweats, or see red streaks rising above the dressing or smell odor / see pus (creamy white drainage), call your doctor immediately or go to a [facility] ER and ask them to page the podiatry residents.  Constipation  If you have severe constipation after surgery, this can be due to the pain medication. Notify your doctor and special medication will be prescribed to deal with this.   Blood Clots  If you had surgery and are in a cast, have an external fixation device, or are non-weightbearing using crutches, a knee scooter, a wheelchair, a walker, or an iWalk device - you need to be on a blood thinner. Your doctor will prescribe one of the regimens below. If you run out of the blood thinner checked off below before you are walking normally on your foot and out of your cast - notify your doctor immediately so you can get a refill. Not doing so can lead to blood clots and serious complications including death.    Aspirin 325mg daily    Numbness  It is normal for your foot to be numb until about dinner time. If you’ve had a popliteal block procedure, you might be numb until the following day. When you start to feel pins and needles in the foot - this means the block is wearing off. That is the appropriate time to take your pain medication.   Pain Medication  Do not supplement your pain medication with over the counter drugs, old leftover pain medications, or extra Tylenol. You must discuss any additional medications with your doctor prior to taking them for pain.   Driving  No driving is allowed  without discussion with the doctor  Ambulation  Nonweightbearing to surgical foot  If given a flat, stiff shoe / darco wedge shoe, Do not walk at all without it.  Use a device (cane, walker, crutches) to take some weight off of the foot when walking  If instructed not to put weight on the surgical foot, use the following:  Crutches and Knee scooter     Putting weight on the foot will lead to complications.

## 2025-04-28 ENCOUNTER — ANESTHESIA (OUTPATIENT)
Dept: PERIOP | Facility: HOSPITAL | Age: 25
End: 2025-04-28
Payer: COMMERCIAL

## 2025-04-28 ENCOUNTER — APPOINTMENT (OUTPATIENT)
Dept: RADIOLOGY | Facility: HOSPITAL | Age: 25
End: 2025-04-28
Payer: COMMERCIAL

## 2025-04-28 ENCOUNTER — HOSPITAL ENCOUNTER (OUTPATIENT)
Facility: HOSPITAL | Age: 25
Setting detail: OUTPATIENT SURGERY
Discharge: HOME | End: 2025-04-28
Payer: COMMERCIAL

## 2025-04-28 VITALS
SYSTOLIC BLOOD PRESSURE: 147 MMHG | HEART RATE: 65 BPM | DIASTOLIC BLOOD PRESSURE: 85 MMHG | WEIGHT: 276 LBS | BODY MASS INDEX: 32.59 KG/M2 | RESPIRATION RATE: 20 BRPM | TEMPERATURE: 97 F | HEIGHT: 77 IN | OXYGEN SATURATION: 100 %

## 2025-04-28 DIAGNOSIS — Z98.890 POST-OPERATIVE STATE: Primary | ICD-10-CM

## 2025-04-28 PROCEDURE — 99024 POSTOP FOLLOW-UP VISIT: CPT

## 2025-04-28 PROCEDURE — 28118 REMOVAL OF HEEL BONE: CPT

## 2025-04-28 PROCEDURE — 76000 FLUOROSCOPY <1 HR PHYS/QHP: CPT

## 2025-04-28 PROCEDURE — C1713 ANCHOR/SCREW BN/BN,TIS/BN: HCPCS

## 2025-04-28 PROCEDURE — NC001 PR NO CHARGE: Performed by: STUDENT IN AN ORGANIZED HEALTH CARE EDUCATION/TRAINING PROGRAM

## 2025-04-28 PROCEDURE — 73610 X-RAY EXAM OF ANKLE: CPT

## 2025-04-28 DEVICE — BC ACHILLES SPDBRG W/ KL DX CC, 3.9 MM
Type: IMPLANTABLE DEVICE | Site: HEEL | Status: FUNCTIONAL
Brand: ARTHREX®

## 2025-04-28 RX ORDER — OXYCODONE AND ACETAMINOPHEN 5; 325 MG/1; MG/1
1 TABLET ORAL EVERY 4 HOURS PRN
Qty: 20 TABLET | Refills: 0 | Status: SHIPPED | OUTPATIENT
Start: 2025-04-28 | End: 2025-05-08

## 2025-04-28 RX ORDER — ACETAMINOPHEN 325 MG/1
650 TABLET ORAL EVERY 4 HOURS PRN
Status: DISCONTINUED | OUTPATIENT
Start: 2025-04-28 | End: 2025-04-28 | Stop reason: HOSPADM

## 2025-04-28 RX ORDER — PROPOFOL 10 MG/ML
INJECTION, EMULSION INTRAVENOUS AS NEEDED
Status: DISCONTINUED | OUTPATIENT
Start: 2025-04-28 | End: 2025-04-28

## 2025-04-28 RX ORDER — ONDANSETRON 2 MG/ML
INJECTION INTRAMUSCULAR; INTRAVENOUS AS NEEDED
Status: DISCONTINUED | OUTPATIENT
Start: 2025-04-28 | End: 2025-04-28

## 2025-04-28 RX ORDER — ROCURONIUM BROMIDE 10 MG/ML
INJECTION, SOLUTION INTRAVENOUS AS NEEDED
Status: DISCONTINUED | OUTPATIENT
Start: 2025-04-28 | End: 2025-04-28

## 2025-04-28 RX ORDER — MIDAZOLAM HYDROCHLORIDE 2 MG/2ML
INJECTION, SOLUTION INTRAMUSCULAR; INTRAVENOUS AS NEEDED
Status: DISCONTINUED | OUTPATIENT
Start: 2025-04-28 | End: 2025-04-28

## 2025-04-28 RX ORDER — DEXAMETHASONE SODIUM PHOSPHATE 10 MG/ML
INJECTION, SOLUTION INTRAMUSCULAR; INTRAVENOUS AS NEEDED
Status: DISCONTINUED | OUTPATIENT
Start: 2025-04-28 | End: 2025-04-28

## 2025-04-28 RX ORDER — LIDOCAINE HYDROCHLORIDE 20 MG/ML
INJECTION, SOLUTION EPIDURAL; INFILTRATION; INTRACAUDAL; PERINEURAL AS NEEDED
Status: DISCONTINUED | OUTPATIENT
Start: 2025-04-28 | End: 2025-04-28

## 2025-04-28 RX ORDER — BUPIVACAINE HYDROCHLORIDE 5 MG/ML
INJECTION, SOLUTION EPIDURAL; INTRACAUDAL; PERINEURAL
Status: COMPLETED | OUTPATIENT
Start: 2025-04-28 | End: 2025-04-28

## 2025-04-28 RX ORDER — CHLORHEXIDINE GLUCONATE ORAL RINSE 1.2 MG/ML
15 SOLUTION DENTAL ONCE
Status: COMPLETED | OUTPATIENT
Start: 2025-04-28 | End: 2025-04-28

## 2025-04-28 RX ORDER — CHLORHEXIDINE GLUCONATE 40 MG/ML
SOLUTION TOPICAL DAILY PRN
Status: DISCONTINUED | OUTPATIENT
Start: 2025-04-28 | End: 2025-04-28 | Stop reason: HOSPADM

## 2025-04-28 RX ORDER — OXYCODONE AND ACETAMINOPHEN 5; 325 MG/1; MG/1
1 TABLET ORAL EVERY 4 HOURS PRN
Status: DISCONTINUED | OUTPATIENT
Start: 2025-04-28 | End: 2025-04-28 | Stop reason: HOSPADM

## 2025-04-28 RX ORDER — FENTANYL CITRATE 50 UG/ML
INJECTION, SOLUTION INTRAMUSCULAR; INTRAVENOUS AS NEEDED
Status: DISCONTINUED | OUTPATIENT
Start: 2025-04-28 | End: 2025-04-28

## 2025-04-28 RX ORDER — ONDANSETRON 2 MG/ML
4 INJECTION INTRAMUSCULAR; INTRAVENOUS ONCE AS NEEDED
Status: COMPLETED | OUTPATIENT
Start: 2025-04-28 | End: 2025-04-28

## 2025-04-28 RX ORDER — ASPIRIN 325 MG
325 TABLET ORAL DAILY
Qty: 20 TABLET | Refills: 0 | Status: SHIPPED | OUTPATIENT
Start: 2025-04-28

## 2025-04-28 RX ORDER — LORATADINE 10 MG/1
10 TABLET ORAL DAILY
COMMUNITY

## 2025-04-28 RX ORDER — FENTANYL CITRATE/PF 50 MCG/ML
25 SYRINGE (ML) INJECTION
Status: DISCONTINUED | OUTPATIENT
Start: 2025-04-28 | End: 2025-04-28 | Stop reason: HOSPADM

## 2025-04-28 RX ORDER — DIPHENHYDRAMINE HYDROCHLORIDE 50 MG/ML
12.5 INJECTION, SOLUTION INTRAMUSCULAR; INTRAVENOUS
Status: DISCONTINUED | OUTPATIENT
Start: 2025-04-28 | End: 2025-04-28 | Stop reason: HOSPADM

## 2025-04-28 RX ORDER — HYDROMORPHONE HCL/PF 1 MG/ML
0.5 SYRINGE (ML) INJECTION
Status: DISCONTINUED | OUTPATIENT
Start: 2025-04-28 | End: 2025-04-28 | Stop reason: HOSPADM

## 2025-04-28 RX ORDER — SODIUM CHLORIDE, SODIUM LACTATE, POTASSIUM CHLORIDE, CALCIUM CHLORIDE 600; 310; 30; 20 MG/100ML; MG/100ML; MG/100ML; MG/100ML
125 INJECTION, SOLUTION INTRAVENOUS CONTINUOUS
Status: DISCONTINUED | OUTPATIENT
Start: 2025-04-28 | End: 2025-04-28 | Stop reason: HOSPADM

## 2025-04-28 RX ORDER — FAMOTIDINE 20 MG/1
20 TABLET, FILM COATED ORAL DAILY
COMMUNITY

## 2025-04-28 RX ADMIN — SUGAMMADEX 250 MG: 100 INJECTION, SOLUTION INTRAVENOUS at 11:04

## 2025-04-28 RX ADMIN — BUPIVACAINE 20 ML: 13.3 INJECTION, SUSPENSION, LIPOSOMAL INFILTRATION at 09:15

## 2025-04-28 RX ADMIN — OXYCODONE HYDROCHLORIDE AND ACETAMINOPHEN 1 TABLET: 5; 325 TABLET ORAL at 12:04

## 2025-04-28 RX ADMIN — BUPIVACAINE HYDROCHLORIDE 10 ML: 5 INJECTION, SOLUTION EPIDURAL; INTRACAUDAL; PERINEURAL at 09:15

## 2025-04-28 RX ADMIN — ROCURONIUM BROMIDE 10 MG: 10 INJECTION, SOLUTION INTRAVENOUS at 09:53

## 2025-04-28 RX ADMIN — PROPOFOL 200 MG: 10 INJECTION, EMULSION INTRAVENOUS at 09:34

## 2025-04-28 RX ADMIN — FENTANYL CITRATE 50 MCG: 50 INJECTION INTRAMUSCULAR; INTRAVENOUS at 09:21

## 2025-04-28 RX ADMIN — FENTANYL CITRATE 25 MCG: 50 INJECTION INTRAMUSCULAR; INTRAVENOUS at 11:39

## 2025-04-28 RX ADMIN — MIDAZOLAM 2 MG: 1 INJECTION INTRAMUSCULAR; INTRAVENOUS at 09:10

## 2025-04-28 RX ADMIN — DEXAMETHASONE SODIUM PHOSPHATE 10 MG: 10 INJECTION, SOLUTION INTRAMUSCULAR; INTRAVENOUS at 09:40

## 2025-04-28 RX ADMIN — ACETAMINOPHEN 650 MG: 325 TABLET ORAL at 12:04

## 2025-04-28 RX ADMIN — ROCURONIUM BROMIDE 50 MG: 10 INJECTION, SOLUTION INTRAVENOUS at 09:34

## 2025-04-28 RX ADMIN — ONDANSETRON 4 MG: 2 INJECTION INTRAMUSCULAR; INTRAVENOUS at 11:41

## 2025-04-28 RX ADMIN — ONDANSETRON 4 MG: 2 INJECTION INTRAMUSCULAR; INTRAVENOUS at 10:40

## 2025-04-28 RX ADMIN — PROPOFOL 50 MG: 10 INJECTION, EMULSION INTRAVENOUS at 09:35

## 2025-04-28 RX ADMIN — FENTANYL CITRATE 25 MCG: 50 INJECTION INTRAMUSCULAR; INTRAVENOUS at 11:45

## 2025-04-28 RX ADMIN — CHLORHEXIDINE GLUCONATE 15 ML: 1.2 SOLUTION ORAL at 09:06

## 2025-04-28 RX ADMIN — LIDOCAINE HYDROCHLORIDE 100 MG: 20 INJECTION, SOLUTION EPIDURAL; INFILTRATION; INTRACAUDAL at 09:33

## 2025-04-28 RX ADMIN — Medication 3000 MG: at 09:45

## 2025-04-28 RX ADMIN — FENTANYL CITRATE 50 MCG: 50 INJECTION INTRAMUSCULAR; INTRAVENOUS at 09:34

## 2025-04-28 RX ADMIN — SODIUM CHLORIDE, SODIUM LACTATE, POTASSIUM CHLORIDE, AND CALCIUM CHLORIDE 125 ML/HR: .6; .31; .03; .02 INJECTION, SOLUTION INTRAVENOUS at 09:06

## 2025-04-28 RX ADMIN — FENTANYL CITRATE 25 MCG: 50 INJECTION INTRAMUSCULAR; INTRAVENOUS at 11:51

## 2025-04-28 NOTE — PROGRESS NOTES
Discussed pain management with pt and father.  Patient is happy with the level of pain management.  Ready to go home

## 2025-04-28 NOTE — OP NOTE
OPERATIVE REPORT - Podiatry  PATIENT NAME: Mercy Sandy    :  2000  MRN: [id number]  Pt Location: [facility] OR ROOM 03    SURGERY DATE: 2025    Surgeons and Role:     * Ovidio Kwon DPM - Primary     * Cristine Christianson DPM - Fellow    Pre-op Diagnosis:  Indira's deformity of right heel [M92.61]    Post-Op Diagnosis Codes:     * Indira's deformity of right heel [M92.61]    Procedure(s) (LRB):  EXCISION EXOSTOSIS, correction Haglunds deformity, detachment and reattachment of Achilles tendon (Right)    Specimen(s):  * No specimens in log *    Estimated Blood Loss:   Minimal    Drains:  * No LDAs found *    Anesthesia Type:   General w/ Popliteal Block     Hemostasis:  Pneumatic thigh tourniquet set at 300 mmHg for 65 mins  Direct compression    Materials:  Implant Name Type Inv. Item Serial No.  Lot No. LRB No. Used Action   IMPLANT SPEEDBRIDGE BC ACHILLES W.DK DX CC 3.9MM - SN/A  IMPLANT SPEEDBRIDGE BC ACHILLES W.DK DX CC 3.9MM N/A [employer] [id number] Right 1 Implanted     2-0, 3-0 and 4-0 vicryl  3-0 nylon    Injectables:  None    Operative Findings:  Resection of prominent posterior-superior aspect of calcaneus, Achilles tendon appeared healthy with no hypertrophy or gross tears    Complications:   None    Procedure and Technique:     Under mild sedation, the patient was brought into the operating room and placed on the operating room table in the prone position. IV sedation was achieved by anesthesia team and a universal timeout was performed where all parties are in agreement of correct patient, correct procedure and correct site. A pneumatic tourniquet was then placed over the patient's right lower extremity with ample padding. The foot was then prepped and draped in the usual aseptic manner. An esmarch bandage was used to exsangunate the foot and the pneumatic tourniquet was then inflated to 300 mmHg.     Attention was directed to the posterior achilles tendon where an  approximately 7 cm longitudinal incision was made through the skin using a 15 blade. Blunt dissection through the subcutaneous tissue was performed, small vessels were cauterized for hemostasis. A longitudinal incision was made through the peritenon along the entire length of the surgical site.  The Achilles tendon was sharply reflected from the calcaneus medially and laterally leaving the far medial and lateral attachment intact. The tendon was healthy in appearance, not hypertrophic, without gross tear. No significant retro-calcaneal bursae appreciated.      The achilles tendon was sharply debrided of non-viable tendinotic tissue. Using a sagittal saw the posterior heel prominence was removed.  The margins were beveled via sagittal saw and rasp.  Under C-arm the correction was noted to be adequate     Using Arthrex speedbridge tray, and following manufacture guide, the achilles tendon was now re-attached to the calcaneus. The tendon was stable to challenge. Redundant suture was removed and incision was copiously irrigated with NSS. Achilles tendon repair reinforced with Vicryl suture proximally.     Subcutaneous closure was obtained utilizing Vicryl in an interrupted suture technique. Skin edges were reapproximated and closure was obtained utilizing Nylon suture in horizontal mattress technique. The foot was then cleansed and dried.  The incision site was dressed with Adaptic, 4x4 gauze, and ABD. This was then covered with a Julio and an ACE wrap. A Posterior splint was then applied with the foot in slight plantarflexion.        The tourniquet was deflated at approximately 65 min and normal hyperemic response was noted to all digits. The patient tolerated the procedure and anesthesia well without immediate complications and transferred to PACU with vital signs stable.       I was present during the entire procedure and participated in all key aspects.    SIGNATURE: Ovidio Kwon DPM  DATE: April 28, 2025  TIME:  "10:53 AM      Portions of the record may have been created with voice recognition software. Occasional wrong word or \"sound a like\" substitutions may have occurred due to the inherent limitations of voice recognition software. Read the chart carefully and recognize, using context, where substitutions have occurred.            "

## 2025-04-28 NOTE — ANESTHESIA PREPROCEDURE EVALUATION
Procedure:  EXCISION EXOSTOSIS, correction Haglunds deformity, detachment and reattachment of Achilles tendon (Right: Foot)    Relevant Problems   Orthopedic/Musculoskeletal   (+) Achilles tendon pain   (+) Acute right ankle pain   (+) Indira's deformity of right heel        Physical Exam    Airway    Mallampati score: II         Dental       Cardiovascular      Pulmonary      Other Findings        Anesthesia Plan  ASA Score- 2     Anesthesia Type- general with ASA Monitors.         Additional Monitors:     Airway Plan: ETT.           Plan Factors-    Chart reviewed.                      Induction- intravenous.    Postoperative Plan-         Informed Consent- Anesthetic plan and risks discussed with patient.  I personally reviewed this patient with the CRNA. Discussed and agreed on the Anesthesia Plan with the CRNA..      NPO Status:  No vitals data found for the desired time range.

## 2025-04-28 NOTE — ANESTHESIA POSTPROCEDURE EVALUATION
Post-Op Assessment Note    CV Status:  Stable    Pain management: adequate       Mental Status:  Alert and awake   Hydration Status:  Euvolemic   PONV Controlled:  Controlled   Airway Patency:  Patent  Two or more mitigation strategies used for obstructive sleep apnea   Post Op Vitals Reviewed: Yes    No anethesia notable event occurred.    Staff: Anesthesiologist, CRNA           Last Filed PACU Vitals:  Vitals Value Taken Time   Temp 98    Pulse 93 04/28/25 1121   /66    Resp 18    SpO2 99 % 04/28/25 1121   Vitals shown include unfiled device data.

## 2025-04-28 NOTE — DISCHARGE SUMMARY
Discharge Summary Outpatient Procedure Podiatry -   Mercy Sandy 24 y.o. male MRN: [id number]  Unit/Bed#: OR POOL Encounter: [id number]    Admission Date: 4/28/2025     Admitting Diagnosis: Indira's deformity of right heel [M92.61]    Discharge Diagnosis: same    Procedures Performed: EXCISION EXOSTOSIS, correction Haglunds deformity, detachment and reattachment of Achilles tendon: 97934 (CPT®)    Complications: none    Condition at Discharge: stable    Discharge instructions/Information to patient and family:   See after visit summary for information provided to patient and family.      Provisions for Follow-Up Care/Important appointments:  See after visit summary for information related to follow-up care and any pertinent home health orders.      Discharge Medications:  See after visit summary for reconciled discharge medications provided to patient and family.

## 2025-04-28 NOTE — ANESTHESIA PROCEDURE NOTES
Peripheral Block    Patient location during procedure: holding area  Start time: 4/28/2025 9:10 AM  Reason for block: at surgeon's request and post-op pain management  Staffing  Performed by: Khoa Alvarado DO  Authorized by: Khoa Alvarado DO    Preanesthetic Checklist  Completed: patient identified, IV checked, site marked, risks and benefits discussed, surgical consent, monitors and equipment checked, pre-op evaluation and timeout performed  Peripheral Block  Patient position: left lateral  Prep: ChloraPrep  Patient monitoring: frequent blood pressure checks, continuous pulse oximetry and heart rate  Block type: Popliteal  Laterality: right  Injection technique: single-shot  Procedures: ultrasound guided, Ultrasound guidance required for the procedure to increase accuracy and safety of medication placement and decrease risk of complications.  Ultrasound permanent image saved  bupivacaine (PF) (MARCAINE) 0.5 % injection 20 mL - Perineural   10 mL - 4/28/2025 9:15:00 AM  bupivacaine liposomal (EXPAREL) 1.3 % injection 20 mL - Perineural   20 mL - 4/28/2025 9:15:00 AM  Needle  Needle type: Stimuplex   Needle gauge: 18 G  Needle length: 4 in  Needle localization: anatomical landmarks and ultrasound guidance  Assessment  Injection assessment: incremental injection, frequent aspiration, injected with ease, negative aspiration, negative for heart rate change, no paresthesia on injection, no symptoms of intraneural/intravenous injection and needle tip visualized at all times  Paresthesia pain: none  Post-procedure:  site cleaned  patient tolerated the procedure well with no immediate complications

## 2025-05-12 ENCOUNTER — OFFICE VISIT (OUTPATIENT)
Age: 25
End: 2025-05-12

## 2025-05-12 VITALS — BODY MASS INDEX: 32.59 KG/M2 | HEIGHT: 77 IN | WEIGHT: 276 LBS

## 2025-05-12 DIAGNOSIS — M92.61 HAGLUND'S DEFORMITY OF RIGHT HEEL: Primary | ICD-10-CM

## 2025-05-12 DIAGNOSIS — Z98.890 POST-OPERATIVE STATE: ICD-10-CM

## 2025-05-12 PROCEDURE — 99024 POSTOP FOLLOW-UP VISIT: CPT

## 2025-05-12 NOTE — PROGRESS NOTES
Name: Mercy Sandy      : 2000      MRN: [id number]  Encounter Provider: Ovidio Kwon DPM  Encounter Date: 2025   Encounter department: [facility]    Assessment & Plan     1. Indira's deformity of right heel  -     Ambulatory Referral to Physical Therapy; Future  2. Post-operative state      Sutures were left in place today.    Weightbearing as tolerated in cam boot  Plan for return evaluation 1 to 2 weeks for consideration of suture removal.  Patient should not place moisturizing lotions around the surgical incision.  Patient may not wash the area with antibacterial soap and water.  I do not recommend soaking of the foot or ankle.    Notify the office immediately of any signs of infection including but not limited to: redness around incision, streaking red line up foot or leg, drainage, increased pain, fever, chills, sweats, nausea, vomiting, shortness of breath and/or chest pain.  If you are concerned for any reason, we have a physician on call 24 hours a day 7 days a week that can answer your questions.  Please call [phone]    Subjective      Patient presents approximately 2 weeks status post right Indira's deformity correction.  Patient states he has been doing well reports minimal pain to surgical site.  Has been resting, icing behind knee and elevating as much as possible.  He denies any calf pain today.    DOS 2025      Review of Systems      Constitutional: Negative for fever or chills.   Respiratory: Negative for shortness of breath.    Cardiovascular: Negative for chest pain.  Gastrointestinal: Negative for nausea and vomiting.   Musculoskeletal: No calf pain    Current Outpatient Medications on File Prior to Visit   Medication Sig    aspirin 325 mg tablet Take 1 tablet (325 mg total) by mouth daily    famotidine (PEPCID) 20 mg tablet Take 20 mg by mouth daily    loratadine (CLARITIN) 10 mg tablet Take 10 mg by mouth daily       Objective  "    Ht 6' 5\" (1.956 m)   Wt 125 kg (276 lb)   BMI 32.73 kg/m²     Physical Exam    Incision site appears well healing, without any warmth or redness.  There are no signs of necrosis.  There is some expected swelling.  No calf pain or tenderness on palpation.  Neurological status is at baseline.  Vascular status is at baseline.  "

## 2025-05-19 ENCOUNTER — OFFICE VISIT (OUTPATIENT)
Age: 25
End: 2025-05-19

## 2025-05-19 VITALS — BODY MASS INDEX: 32.59 KG/M2 | WEIGHT: 276 LBS | HEIGHT: 77 IN

## 2025-05-19 DIAGNOSIS — M92.61 HAGLUND'S DEFORMITY OF RIGHT HEEL: Primary | ICD-10-CM

## 2025-05-19 DIAGNOSIS — Z98.890 POST-OPERATIVE STATE: ICD-10-CM

## 2025-05-19 PROCEDURE — 99024 POSTOP FOLLOW-UP VISIT: CPT

## 2025-05-19 NOTE — PROGRESS NOTES
Name: Mercy Sandy      : 2000      MRN: [id number]  Encounter Provider: Ovidio Kwon DPM  Encounter Date: 2025   Encounter department: [facility]    Assessment & Plan     1. Indira's deformity of right heel  2. Post-operative state        Sutures were removed today.  Weightbearing as tolerated in cam boot.  Continue formal physical therapy.  Plan for return evaluation 3 weeks for xrays and tentative plan to transition into supportive sneaker  Patient should not place moisturizing lotions around the surgical incision.  Patient may not wash the area with antibacterial soap and water.  I do not recommend soaking of the foot or ankle.    Notify the office immediately of any signs of infection including but not limited to: redness around incision, streaking red line up foot or leg, drainage, increased pain, fever, chills, sweats, nausea, vomiting, shortness of breath and/or chest pain.  If you are concerned for any reason, we have a physician on call 24 hours a day 7 days a week that can answer your questions.  Please call [phone]    Subjective      Patient presents approximately 3 weeks status post right Indira's deformity correction.  Patient states he has been doing well reports minimal pain to surgical site.  Has been resting, icing behind knee and elevating as much as possible.  He denies any calf pain today.  Has been weightbearing as tolerated in cam boot with no issues.    DOS 2025      Review of Systems      Constitutional: Negative for fever or chills.   Respiratory: Negative for shortness of breath.    Cardiovascular: Negative for chest pain.  Gastrointestinal: Negative for nausea and vomiting.   Musculoskeletal: No calf pain    Current Outpatient Medications on File Prior to Visit   Medication Sig    loratadine (CLARITIN) 10 mg tablet Take 10 mg by mouth in the morning.    aspirin 325 mg tablet Take 1 tablet (325 mg total) by mouth daily  "(Patient not taking: Reported on 5/19/2025)    famotidine (PEPCID) 20 mg tablet Take 20 mg by mouth in the morning. (Patient not taking: Reported on 5/19/2025)       Objective     Ht 6' 5\" (1.956 m)   Wt 125 kg (276 lb)   BMI 32.73 kg/m²     Physical Exam    Incision site appears well healing, without any warmth or redness.  There are no signs of necrosis.  There is some expected swelling.  No calf pain or tenderness on palpation.  Neurological status is at baseline.  Vascular status is at baseline.  "

## 2025-05-23 ENCOUNTER — TELEPHONE (OUTPATIENT)
Age: 25
End: 2025-05-23

## 2025-05-23 NOTE — TELEPHONE ENCOUNTER
Caller: Mercy Sandy    Doctor and/or Office: Dr. Kwon/masoud    #: [phone]    Escalation: Last office visit Patient would like a call back to discuss his weight bearing status. Thank you

## 2025-06-06 ENCOUNTER — EVALUATION (OUTPATIENT)
Dept: PHYSICAL THERAPY | Facility: CLINIC | Age: 25
End: 2025-06-06
Payer: COMMERCIAL

## 2025-06-06 DIAGNOSIS — M92.61 HAGLUND'S DEFORMITY, RIGHT: Primary | ICD-10-CM

## 2025-06-06 DIAGNOSIS — M79.671 PAIN OF RIGHT HEEL: ICD-10-CM

## 2025-06-06 PROCEDURE — 97110 THERAPEUTIC EXERCISES: CPT | Performed by: PHYSICAL THERAPIST

## 2025-06-06 PROCEDURE — 97162 PT EVAL MOD COMPLEX 30 MIN: CPT | Performed by: PHYSICAL THERAPIST

## 2025-06-06 NOTE — PROGRESS NOTES
PT Evaluation     Today's date: 2025  Patient name: Mercy Sandy  : 2000  MRN: [id number]  Referring provider: Ovidio Kwon DPM  Dx:   Encounter Diagnosis     ICD-10-CM    1. 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 Pain of right heel  M79.671                      Assessment  Symptom irritability: moderate    Assessment details: Mercy Sandy is a pleasant 24 y.o. presenting to physical therapy with MD referral for Indira's deformity, right  (primary encounter diagnosis) and Pain of right heel S/p haglunds correction on 25 by Ovidio Kwon DPM.    Problem list:  Limited ankle ROM, decreased hip/core strength, limited lower extremity flexibility, abnormal gait, and atrophy of right gastrocnemius    Treatment to include: Manual therapy techniques, lower extremity/core strengthening, neuromuscular control exercises, balance/proprioception training, gait training, instruction in a comprehensive HEP, and modalities as needed.     This pt would benefit from skilled PT services to address their impairments and functional limitations to maximize functional outcome.     Barriers to therapy: GERD, physical nature of job  Understanding of Dx/Px/POC: good     Prognosis: good    Goals  ST. Pt will improve ankle inversion ROM to at least 40 degrees in 4 weeks.  2. Pt will improve gait to minimal to no deviations without CAM boot in 4 weeks.    LT. 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  2. Pt will be independent in a comprehensive HEP in 8 weeks.   3. Pt will be able to ascend and descend inclines with minimal to no discomfort in 8 weeks.    Plan  Patient would benefit from: skilled physical therapy    Frequency: 2-3x week  Duration in weeks: 8  Plan of Care beginning date: 2025  Plan of Care expiration date: 2025  Treatment plan discussed with: patient  Plan details: Follow MD restrictions when attained (requested 25)      Subjective  "Evaluation    History of Present Illness  Mechanism of injury: Pt builds semi trucks for a living. He was pushing an andrew a little too hard in November 2024.He thought he overstretched his achilles at that time. He gave himself a few weeks of \"light duty\" to rest.  January 10th, pt felt significant pain and was unable to continue working.  On January 13 th, pt states he fell when walking up the stairs due to his right achilles tightening and then loosened and then pt was in pain. Pt went to urgent care who placed pt in a CAM boot and took an xray. Pt was referred to podiatry who did an MRI. MRI 1-24-25 revealed \"IMPRESSION:  1. 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  2. Unremarkable appearance to the Achilles tendon.\"     Pt attended approximately 1 month of skilled PT services with some improvement in symptoms. Pt then decided he wanted to undergo surgery. Pt underwent a koffi's correction surgery on 4-28-25 by Ovidio Kwon DPM. Pt was NWB for 2 weeks, then 2 weeks later half of stitches removed, then one week later the remaining stiches were removed due to delayed healing of distal aspect of incision. Pt has been WBAT for the past two weeks in the CAM boot with one heel wedge which will be removed in the next week.  Pt has experienced one fall with boot donned and MD is aware. Pt has taken 3-5 steps at a time to get in/out of the shower with some discomfort and tightness in his heel. Pt reports occasional tingling over the scab on his heel.      Premorbid status:  - ADLs: Independent with no difficulty  - Work: Full time, Full duty  - Recreation: occasional walks    Current status:  - ADLs/Functional activities:   - Stairs Reciprocal pattern with boot donned to ascend, step to pattern to descend with boot donned with mild pain   - Sit to stand with mild pain with use of BUEs   - Walking 30 minutes with CAM boot " over level surface prior to increase in pain   - Standing 20 minutes with CAM boot prior for need for seated rest   - Sleeping with 1-2 nightly sleep disturbances due to pain  - Work: Unable to work secondary to dysfunction  - Recreation: none   Patient Goals  Patient goals for therapy: decreased pain, increased strength, return to sport/leisure activities, independence with ADLs/IADLs and return to work    Pain  Current pain ratin  At best pain ratin  At worst pain ratin  Location: posterior heel  Quality: dull ache, sharp and knife-like  Relieving factors: rest  Exacerbated by: prolonged positions.  Progression: improved    Treatments  Previous treatment: physical therapy  Current treatment: physical therapy  Current treatment comments: surgery.       Objective     Observations     Additional Observation Details  Black scabbing present over distal incision.    Active Range of Motion   Left Ankle/Foot   Dorsiflexion (ke): 15 degrees   Plantar flexion: 65 degrees   Inversion: 40 degrees   Eversion: 20 degrees     Right Ankle/Foot   Inversion: 20 degrees   Eversion: 18 degrees     Additional Active Range of Motion Details  No AROM DF or PF on RLE due to post operative status    Strength/Myotome Testing     Left Hip   Planes of Motion   Flexion: 5  External rotation: 5  Internal rotation: 5    Right Hip   Planes of Motion   Flexion: 5  External rotation: 5  Internal rotation: 5    Left Knee   Flexion: 5  Extension: 5    Right Knee   Flexion: 5  Extension: 5    Left Ankle/Foot   Dorsiflexion: 5  Plantar flexion: 5  Inversion: 5  Eversion: 5    Additional Strength Details  Gait: Pt ambulates over level surface with CAM boot donned on RLE with no AD with a mildly antalgic gait.    General Comments:      Ankle/Foot Comments   Girth:  Left gastroc: 15 cm below TFJ line: 45.5 cm  Right gastroc: 15 cm below TFJ line: 42 cm            Precautions: GERD, WBAT in CAM boot (requested MD restrictions/protocol  "6-6-25)  CO-MORBIDITIES:  HEP ACCESS CODE:   FOTO Completed On: 6-6-25    POC Expires Reeval for Medicare to be completed Re-eval last completed on Unit Limit Auth Start Date Auth Expiration Date PT/OT/ST  Visit Limit   8-1-25 By visit  10  unknown NA NA 90 ppy                         TREATMENT DIARY  Auth Status DATE 6-6 (IE)            NA Visit # 1             Remaining 9            MANUAL THERAPY                                                                                           THERAPEUTIC EXERCISE             NuStep in CAM boot to improve LE strength/endurance 10 mins, L4 NV                         Table:             - hip 4 way 2 x 10 ea                                                                Machine:             - leg extension             - HS curls                                       NEUROMUSCULAR REEDUCATION              Seated:             - towel scrunches 2 mins x 5\" NV            - doming 2 mins x 5\" NV                                                                                                                                                                                                                                                      THERAPEUTIC ACTIVITY             Front step ups 2 x 10 6\" step with CAM boot NV            Lateral step up 2 x 10 6\" step with CAM boot NV            Leg press 2 x 10 50# with CAM boot NV                                                                                                                                                                                     GAIT TRAINING                                                                              MODALITIES                                            * On initial evaluation, educated pt on anatomy, pathology, and exercise rationale. Provided pt with basic HEP and ensured proper exercise performance. Educated pt to call with any questions or concerns.       " performance. Educated pt to call with any questions or concerns.

## 2025-06-09 ENCOUNTER — OFFICE VISIT (OUTPATIENT)
Age: 25
End: 2025-06-09

## 2025-06-09 VITALS — HEIGHT: 77 IN | BODY MASS INDEX: 31.29 KG/M2 | WEIGHT: 265 LBS

## 2025-06-09 DIAGNOSIS — M92.61 HAGLUND DEFORMITY OF RIGHT HEEL: ICD-10-CM

## 2025-06-09 DIAGNOSIS — Z98.890 POST-OPERATIVE STATE: Primary | ICD-10-CM

## 2025-06-09 PROCEDURE — 99024 POSTOP FOLLOW-UP VISIT: CPT

## 2025-06-09 NOTE — PROGRESS NOTES
Name: Mercy Sandy      : 2000      MRN: [id number]  Encounter Provider: Oviido Kwon DPM  Encounter Date: 2025   Encounter department: [facility] PODIATRY [facility] [place]    Assessment & Plan     1. Post-operative state  -     XR ankle 3+ vw right; Future; Expected date: 2025  2. Indira deformity of right heel          Weightbearing as tolerated in supportive sneaker.  Continue formal physical therapy.  Patient should not place moisturizing lotions around the surgical incision.  Patient may not wash the area with antibacterial soap and water.  I do not recommend soaking of the foot or ankle.    Notify the office immediately of any signs of infection including but not limited to: redness around incision, streaking red line up foot or leg, drainage, increased pain, fever, chills, sweats, nausea, vomiting, shortness of breath and/or chest pain.  If you are concerned for any reason, we have a physician on call 24 hours a day 7 days a week that can answer your questions.  Please call [phone]    Subjective      Mercy presents approximately 6 weeks status post right Indira's deformity correction.  Patient states that he has been doing well with minimal pain to surgical site.  He has been weightbearing as tolerated in a cam boot.  Started formal physical therapy.      DOS 2025      Review of Systems      Constitutional: Negative for fever or chills.   Respiratory: Negative for shortness of breath.    Cardiovascular: Negative for chest pain.  Gastrointestinal: Negative for nausea and vomiting.   Musculoskeletal: No calf pain    Current Outpatient Medications on File Prior to Visit   Medication Sig    famotidine (PEPCID) 20 mg tablet Take 20 mg by mouth in the morning.    loratadine (CLARITIN) 10 mg tablet Take 10 mg by mouth in the morning.    aspirin 325 mg tablet Take 1 tablet (325 mg total) by mouth daily (Patient not taking: Reported on 2025)       Objective     Ht  "6' 5\" (1.956 m)   Wt 120 kg (265 lb)   BMI 31.42 kg/m²     Physical Exam    Incision site appears well healing, without any warmth or redness.  There are no signs of necrosis.  There is some expected swelling.  No calf pain or tenderness on palpation.  Neurological status is at baseline.  Vascular status is at baseline.  "

## 2025-06-10 ENCOUNTER — OFFICE VISIT (OUTPATIENT)
Dept: PHYSICAL THERAPY | Facility: CLINIC | Age: 25
End: 2025-06-10
Payer: COMMERCIAL

## 2025-06-10 DIAGNOSIS — M79.671 PAIN OF RIGHT HEEL: ICD-10-CM

## 2025-06-10 DIAGNOSIS — M92.61 HAGLUND'S DEFORMITY, RIGHT: Primary | ICD-10-CM

## 2025-06-10 PROCEDURE — 97530 THERAPEUTIC ACTIVITIES: CPT | Performed by: PHYSICAL THERAPIST

## 2025-06-10 PROCEDURE — 97110 THERAPEUTIC EXERCISES: CPT | Performed by: PHYSICAL THERAPIST

## 2025-06-10 NOTE — PROGRESS NOTES
"Daily Note     Today's date: 6/10/2025  Patient name: Mercy Sandy  : 2000  MRN: [id number]  Referring provider: Ovidio Kwon DPM  Dx:   Encounter Diagnosis     ICD-10-CM    1. 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 Pain of right heel  M79.671                      Subjective: Patient reports he saw MD who was pleased with his progress. MD has cleared him from his CAM boot into a supportive sneaker.      Objective: See treatment diary below      Assessment: Initiated exercises this session to address impairments noted during initial evaluation. Tolerated treatment well. Patient demonstrated fatigue post treatment, exhibited good technique with therapeutic exercises, and would benefit from continued PT      Plan: Progress treatment as tolerated.       Precautions: GERD, WBAT in supportive sneaker, (requested MD restrictions/protocol 25), WBAT. No DF beyond neutral, no resisted DF until 6weeks, AROM is okay )  CO-MORBIDITIES:  HEP ACCESS CODE:   FOTO Completed On: 25    POC Expires Reeval for Medicare to be completed Re-eval last completed on Unit Limit Auth Start Date Auth Expiration Date PT/OT/ST  Visit Limit   25 By visit  10  unknown NA NA 90 ppy                         TREATMENT DIARY  Auth Status DATE  (IE) 6-10           NA Visit # 1 2            Remaining 9 8           MANUAL THERAPY                                                                                           THERAPEUTIC EXERCISE             NuStep in to improve LE strength/endurance 10 mins, L4 NV 10 mins, L5                        Table:             - hip 4 way 2 x 10 ea  2 x 10 ea           - bridges with TB around knees   20 x 5\" Blue TB           - SL hip ER   20 x 5\" ea Blue TB                                     Machine:             - leg extension   2 x 10, 20#           - HS curls   2 x 10, 40#                                     NEUROMUSCULAR REEDUCATION              Seated (avoid DF > neutral):        " "     - towel scrunches 2 mins x 5\" NV 2 mins x 5\"           - doming 2 mins x 5\" NV 2 mins x 5\"                                                                                         THERAPEUTIC ACTIVITY             Front step ups 2 x 10 6\" step with CAM boot NV 2 x 10, 6\" step            Lateral step up 2 x 10 6\" step with CAM boot NV 2 x 10, 6\" step           Leg press 2 x 10 50# with CAM boot NV 2 x 10 140#                                                               GAIT TRAINING                                                                              MODALITIES                                            "

## 2025-06-13 ENCOUNTER — OFFICE VISIT (OUTPATIENT)
Dept: PHYSICAL THERAPY | Facility: CLINIC | Age: 25
End: 2025-06-13
Payer: COMMERCIAL

## 2025-06-13 DIAGNOSIS — M79.671 PAIN OF RIGHT HEEL: ICD-10-CM

## 2025-06-13 DIAGNOSIS — M92.61 HAGLUND'S DEFORMITY, RIGHT: Primary | ICD-10-CM

## 2025-06-13 PROCEDURE — 97530 THERAPEUTIC ACTIVITIES: CPT | Performed by: PHYSICAL THERAPIST

## 2025-06-13 PROCEDURE — 97110 THERAPEUTIC EXERCISES: CPT | Performed by: PHYSICAL THERAPIST

## 2025-06-13 NOTE — PROGRESS NOTES
"Daily Note     Today's date: 2025  Patient name: Mercy Sandy  : 2000  MRN: [id number]  Referring provider: Ovidio Kwon DPM  Dx:   Encounter Diagnosis     ICD-10-CM    1. 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 Pain of right heel  M79.671                      Subjective: Pt states he was sore following previous session and his ankle felt very stiff.       Objective: See treatment diary below      Assessment: No exercise progressions this date secondary to pt's subjective report of increased soreness and swelling following previous session. Tolerated treatment well. Patient demonstrated fatigue post treatment, exhibited good technique with therapeutic exercises, and would benefit from continued PT      Plan: Progress treatment as tolerated.       Precautions: GERD, WBAT in supportive sneaker, (requested MD restrictions/protocol 25), WBAT. No DF beyond neutral, no resisted DF until 6weeks, AROM is okay )  CO-MORBIDITIES:  HEP ACCESS CODE:   FOTO Completed On: 25    POC Expires Reeval for Medicare to be completed Re-eval last completed on Unit Limit Auth Start Date Auth Expiration Date PT/OT/ST  Visit Limit   25 By visit  10  unknown NA NA 90 ppy                         TREATMENT DIARY  Auth Status DATE  (IE) 6-10 6-13          NA Visit # 1 2 3           Remaining 9 8 7          MANUAL THERAPY                                                                                           THERAPEUTIC EXERCISE             NuStep in to improve LE strength/endurance 10 mins, L4 NV 10 mins, L5 10 mins, L5                       Table:             - hip 4 way 2 x 10 ea  2 x 10 ea 2 x 10 ea          - bridges with TB around knees   20 x 5\" Blue TB 20 x 5\" Blue TB          - SL hip ER   20 x 5\" ea Blue TB 20 x 5\" ea Blue TB                                    Machine:             - leg extension   2 x 10, 20# 2 x 10, 20#          - HS curls   2 x 10, 40# 2 x 10, 40#                             " "       NEUROMUSCULAR REEDUCATION              Seated (avoid DF > neutral):             - towel scrunches 2 mins x 5\" NV 2 mins x 5\" 2 mins x 5\"          - doming 2 mins x 5\" NV 2 mins x 5\" 2 mins x 5\"                                                                                        THERAPEUTIC ACTIVITY             Front step ups 2 x 10 6\" step with CAM boot NV 2 x 10, 6\" step  2 x 10, 6\" step           Lateral step up 2 x 10 6\" step with CAM boot NV 2 x 10, 6\" step 2 x 10, 6\" step           Leg press 2 x 10 50# with CAM boot NV 2 x 10 140# 2 x 10 140#                                                              GAIT TRAINING                                                                              MODALITIES                                             * 23 minutes 1:1 time this date.  "

## 2025-06-17 ENCOUNTER — OFFICE VISIT (OUTPATIENT)
Dept: PHYSICAL THERAPY | Facility: CLINIC | Age: 25
End: 2025-06-17
Payer: COMMERCIAL

## 2025-06-17 DIAGNOSIS — M92.61 HAGLUND'S DEFORMITY, RIGHT: Primary | ICD-10-CM

## 2025-06-17 DIAGNOSIS — M79.671 PAIN OF RIGHT HEEL: ICD-10-CM

## 2025-06-17 PROCEDURE — 97530 THERAPEUTIC ACTIVITIES: CPT

## 2025-06-17 PROCEDURE — 97110 THERAPEUTIC EXERCISES: CPT

## 2025-06-17 NOTE — PROGRESS NOTES
"Daily Note     Today's date: 2025  Patient name: Mercy Sandy  : 2000  MRN: [id number]  Referring provider: Ovidio Kwon DPM  Dx:   Encounter Diagnosis     ICD-10-CM    1. 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 Pain of right heel  M79.671           Start Time: 1410  Stop Time: 1503  Total time in clinic (min): 53 minutes    Subjective: Presents to therapy noting increased irritation in his foot this session, as he was up on his feet helping a family member move.      Objective: See treatment diary below      Assessment: Patient tolerated today's treatment well, continuing to focus on lower extremity strengthening. Heavy tactile cueing needed throughout program to correct form with exercises, especially clamshells. Decreased right quad control displayed performing steps. Patient demonstrated fatigue post treatment and would benefit from continued PT      Plan: Continue per plan of care.      Precautions: GERD, WBAT in supportive sneaker, (requested MD restrictions/protocol 25), WBAT. No DF beyond neutral, no resisted DF until 6weeks, AROM is okay )  CO-MORBIDITIES:  HEP ACCESS CODE:   FOTO Completed On: 25    POC Expires Reeval for Medicare to be completed Re-eval last completed on Unit Limit Auth Start Date Auth Expiration Date PT/OT/ST  Visit Limit   25 By visit  10  unknown NA NA 90 ppy                         TREATMENT DIARY  Auth Status DATE  (IE) 6-10 6         NA Visit # 1 2 3 4          Remaining 9 8 7 6         MANUAL THERAPY                                                                                           THERAPEUTIC EXERCISE             NuStep in to improve LE strength/endurance 10 mins, L4 NV 10 mins, L5 10 mins, L5 L5 10'                      Table:             - hip 4 way 2 x 10 ea  2 x 10 ea 2 x 10 ea 2x10 ea         - bridges with TB around knees   20 x 5\" Blue TB 20 x 5\" Blue TB 20x5''  Blue TB         - SL hip ER   20 x 5\" ea Blue TB 20 x 5\" " "ea Blue TB 20x5'' ea  Blue TB                                   Machine:             - leg extension   2 x 10, 20# 2 x 10, 20# 2x10 20#         - HS curls   2 x 10, 40# 2 x 10, 40# 2x10 40#                                   NEUROMUSCULAR REEDUCATION              Seated (avoid DF > neutral):             - towel scrunches 2 mins x 5\" NV 2 mins x 5\" 2 mins x 5\" 2 mins x 5'         - doming 2 mins x 5\" NV 2 mins x 5\" 2 mins x 5\" 2 mins x 5''                                                                                       THERAPEUTIC ACTIVITY             Front step ups 2 x 10 6\" step with CAM boot NV 2 x 10, 6\" step  2 x 10, 6\" step  2x10 6''  step         Lateral step up 2 x 10 6\" step with CAM boot NV 2 x 10, 6\" step 2 x 10, 6\" step  2x10 6''  step         Leg press 2 x 10 50# with CAM boot NV 2 x 10 140# 2 x 10 140# 2 x 10 140#                                                             GAIT TRAINING                                                                              MODALITIES                                                "

## 2025-06-20 ENCOUNTER — OFFICE VISIT (OUTPATIENT)
Dept: PHYSICAL THERAPY | Facility: CLINIC | Age: 25
End: 2025-06-20
Payer: COMMERCIAL

## 2025-06-20 DIAGNOSIS — M92.61 HAGLUND'S DEFORMITY, RIGHT: ICD-10-CM

## 2025-06-20 DIAGNOSIS — M79.671 PAIN OF RIGHT HEEL: Primary | ICD-10-CM

## 2025-06-20 PROCEDURE — 97530 THERAPEUTIC ACTIVITIES: CPT | Performed by: PHYSICAL THERAPIST

## 2025-06-20 PROCEDURE — 97112 NEUROMUSCULAR REEDUCATION: CPT | Performed by: PHYSICAL THERAPIST

## 2025-06-20 NOTE — PROGRESS NOTES
"Daily Note     Today's date: 2025  Patient name: Mercy Sandy  : 2000  MRN: [id number]  Referring provider: Ovidio Kwon DPM  Dx:   Encounter Diagnosis     ICD-10-CM    1. Pain of right heel  M79.671       2. 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       Objective: See treatment diary below      Assessment: Progressed exercises this session to enhance neuromuscular control and balance. Pt was able to tolerate all progressions without reports of increase in pain. Pt demonstrates less gait deviations this session walking throughout clinic. Tolerated treatment well. Patient demonstrated fatigue post treatment, exhibited good technique with therapeutic exercises, and would benefit from continued PT      Plan: Progress treatment as tolerated.       Precautions: GERD, WBAT in supportive sneaker, (requested MD restrictions/protocol 25), WBAT. No DF beyond neutral, no resisted DF until 6weeks, AROM is okay )  CO-MORBIDITIES:  HEP ACCESS CODE:   FOTO Completed On: 25    POC Expires Reeval for Medicare to be completed Re-eval last completed on Unit Limit Auth Start Date Auth Expiration Date PT/OT/ST  Visit Limit   25 By visit  10  unknown NA NA 90 ppy                         TREATMENT DIARY  Auth Status DATE  (IE) 6-10 6-13 6-17 6-20        NA Visit # 1 2 3 4 5         Remaining 9 8 7 6 5        MANUAL THERAPY                                                                                           THERAPEUTIC EXERCISE             NuStep in to improve LE strength/endurance 10 mins, L4 NV 10 mins, L5 10 mins, L5 L5 10' 10 mins, L5                     Table:             - hip 4 way 2 x 10 ea  2 x 10 ea 2 x 10 ea 2x10 ea         - bridges with TB around knees   20 x 5\" Blue TB 20 x 5\" Blue TB 20x5''  Blue TB 20x5''  Blue TB        - SL hip ER   20 x 5\" ea Blue TB 20 x 5\" ea Blue TB 20x5'' " "ea  Blue TB 20x5''  Blue TB        - ankle TB 4 way     2 x 10 RTB                     Machine:             - leg extension   2 x 10, 20# 2 x 10, 20# 2x10 20#  2 x 10, 30#        - HS curls   2 x 10, 40# 2 x 10, 40# 2x10 40# 2  x10, 50#                                  NEUROMUSCULAR REEDUCATION              Seated (avoid DF > neutral):             - towel scrunches 2 mins x 5\" NV 2 mins x 5\" 2 mins x 5\" 2 mins x 5' 2 mins x 5'        - doming 2 mins x 5\" NV 2 mins x 5\" 2 mins x 5\" 2 mins x 5'' 2 mins x 5'        - [place] board (ML)     20 x ea                     Standing:             - TB hip 3 way      2 x 10 ea RTB                                  THERAPEUTIC ACTIVITY             Front step ups 2 x 10 6\" step with CAM boot NV 2 x 10, 6\" step  2 x 10, 6\" step  2x10 6''  step 2x10 8''  step        Lateral step up 2 x 10 6\" step with CAM boot NV 2 x 10, 6\" step 2 x 10, 6\" step  2x10 6''  step 2x10 8''  step        Leg press 2 x 10 50# with CAM boot NV 2 x 10 140# 2 x 10 140# 2 x 10 140# 2 x 10, 140#                                                            GAIT TRAINING                                                                              MODALITIES                                           * 23 minutes 1:1 time this date.  "

## 2025-06-24 ENCOUNTER — APPOINTMENT (OUTPATIENT)
Dept: PHYSICAL THERAPY | Facility: CLINIC | Age: 25
End: 2025-06-24
Payer: COMMERCIAL

## 2025-06-24 ENCOUNTER — TELEPHONE (OUTPATIENT)
Age: 25
End: 2025-06-24

## 2025-06-24 NOTE — PROGRESS NOTES
"Daily Note     Today's date: 2025  Patient name: Mercy Sandy  : 2000  MRN: [id number]  Referring provider: Ovidio Kwon DPM  Dx: No diagnosis found.               Subjective: ***      Objective: See treatment diary below      Assessment: Tolerated treatment {Tolerated treatment :}. Patient {assessment:8931243012}      Plan: {PLAN:}     Precautions: GERD, WBAT in supportive sneaker, (requested MD restrictions/protocol 25), WBAT. No DF beyond neutral, no resisted DF until 6weeks, AROM is okay )  CO-MORBIDITIES:  HEP ACCESS CODE:   FOTO Completed On: 25    POC Expires Reeval for Medicare to be completed Re-eval last completed on Unit Limit Auth Start Date Auth Expiration Date PT/OT/ST  Visit Limit   25 By visit  10  unknown NA NA 90 ppy                         TREATMENT DIARY  Auth Status DATE  (IE) 6-10 6-13 6-17 6        NA Visit # 1 2 3 4 5         Remaining 9 8 7 6 5        MANUAL THERAPY                                                                                           THERAPEUTIC EXERCISE             NuStep in to improve LE strength/endurance 10 mins, L4 NV 10 mins, L5 10 mins, L5 L5 10' 10 mins, L5                     Table:             - hip 4 way 2 x 10 ea  2 x 10 ea 2 x 10 ea 2x10 ea         - bridges with TB around knees   20 x 5\" Blue TB 20 x 5\" Blue TB 20x5''  Blue TB 20x5''  Blue TB        - SL hip ER   20 x 5\" ea Blue TB 20 x 5\" ea Blue TB 20x5'' ea  Blue TB 20x5''  Blue TB        - ankle TB 4 way     2 x 10 RTB                     Machine:             - leg extension   2 x 10, 20# 2 x 10, 20# 2x10 20#  2 x 10, 30#        - HS curls   2 x 10, 40# 2 x 10, 40# 2x10 40# 2  x10, 50#                                  NEUROMUSCULAR REEDUCATION              Seated (avoid DF > neutral):             - towel scrunches 2 mins x 5\" NV 2 mins x 5\" 2 mins x 5\" 2 mins x 5' 2 mins x 5'        - doming 2 mins x 5\" NV 2 mins x 5\" 2 mins x 5\" 2 mins x 5'' 2 " "mins x 5'        - [place] board (ML)     20 x ea                     Standing:             - TB hip 3 way      2 x 10 ea RTB                                  THERAPEUTIC ACTIVITY             Front step ups 2 x 10 6\" step with CAM boot NV 2 x 10, 6\" step  2 x 10, 6\" step  2x10 6''  step 2x10 8''  step        Lateral step up 2 x 10 6\" step with CAM boot NV 2 x 10, 6\" step 2 x 10, 6\" step  2x10 6''  step 2x10 8''  step        Leg press 2 x 10 50# with CAM boot NV 2 x 10 140# 2 x 10 140# 2 x 10 140# 2 x 10, 140#                                                            GAIT TRAINING                                                                              MODALITIES                                              "

## 2025-06-24 NOTE — TELEPHONE ENCOUNTER
Caller: Mercy Sandy    Doctor: Dr. Kwon / [facility]    Reason for call: Mercy was told that he could not put pressure on his right foot for 6 months.  Can Dr. Kwon please reach out to him ?  Thank you.     Call back#: [phone]

## 2025-06-25 ENCOUNTER — TELEPHONE (OUTPATIENT)
Age: 25
End: 2025-06-25

## 2025-06-25 ENCOUNTER — APPOINTMENT (OUTPATIENT)
Dept: PHYSICAL THERAPY | Facility: CLINIC | Age: 25
End: 2025-06-25
Payer: COMMERCIAL

## 2025-06-25 DIAGNOSIS — M92.61 HAGLUND'S DEFORMITY, RIGHT: ICD-10-CM

## 2025-06-25 DIAGNOSIS — M79.671 PAIN OF RIGHT HEEL: Primary | ICD-10-CM

## 2025-06-25 NOTE — PROGRESS NOTES
"Daily Note     Today's date: 2025  Patient name: Mercy Sandy  : 2000  MRN: [id number]  Referring provider: Ovidio Kown DPM  Dx:   Encounter Diagnosis     ICD-10-CM    1. Pain of right heel  M79.671       2. 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 Patient {assessment:[id number]}      Plan: {PLAN:8168044022}     Precautions: GERD, WBAT in supportive sneaker, (requested MD restrictions/protocol 25), WBAT. No DF beyond neutral, no resisted DF until 6weeks, AROM is okay )  CO-MORBIDITIES:  HEP ACCESS CODE:   FOTO Completed On: 25    POC Expires Reeval for Medicare to be completed Re-eval last completed on Unit Limit Auth Start Date Auth Expiration Date PT/OT/ST  Visit Limit   25 By visit  10  unknown NA NA 90 ppy                         TREATMENT DIARY  Auth Status DATE  (IE) 6-10 6-13 6-17 6-20        NA Visit # 1 2 3 4 5         Remaining 9 8 7 6 5        MANUAL THERAPY                                                                                           THERAPEUTIC EXERCISE             NuStep in to improve LE strength/endurance 10 mins, L4 NV 10 mins, L5 10 mins, L5 L5 10' 10 mins, L5                     Table:             - hip 4 way 2 x 10 ea  2 x 10 ea 2 x 10 ea 2x10 ea         - bridges with TB around knees   20 x 5\" Blue TB 20 x 5\" Blue TB 20x5''  Blue TB 20x5''  Blue TB        - SL hip ER   20 x 5\" ea Blue TB 20 x 5\" ea Blue TB 20x5'' ea  Blue TB 20x5''  Blue TB        - ankle TB 4 way     2 x 10 RTB                     Machine:             - leg extension   2 x 10, 20# 2 x 10, 20# 2x10 20#  2 x 10, 30#        - HS curls   2 x 10, 40# 2 x 10, 40# 2x10 40# 2  x10, 50#                                  NEUROMUSCULAR REEDUCATION              Seated (avoid DF > neutral):             - towel scrunches 2 mins x 5\" NV 2 mins x 5\" 2 " "mins x 5\" 2 mins x 5' 2 mins x 5'        - doming 2 mins x 5\" NV 2 mins x 5\" 2 mins x 5\" 2 mins x 5'' 2 mins x 5'        - [place] board (ML)     20 x ea                     Standing:             - TB hip 3 way      2 x 10 ea RTB                                  THERAPEUTIC ACTIVITY             Front step ups 2 x 10 6\" step with CAM boot NV 2 x 10, 6\" step  2 x 10, 6\" step  2x10 6''  step 2x10 8''  step        Lateral step up 2 x 10 6\" step with CAM boot NV 2 x 10, 6\" step 2 x 10, 6\" step  2x10 6''  step 2x10 8''  step        Leg press 2 x 10 50# with CAM boot NV 2 x 10 140# 2 x 10 140# 2 x 10 140# 2 x 10, 140#                                                            GAIT TRAINING                                                                              MODALITIES                                              "

## 2025-06-27 ENCOUNTER — OFFICE VISIT (OUTPATIENT)
Dept: PHYSICAL THERAPY | Facility: CLINIC | Age: 25
End: 2025-06-27
Payer: COMMERCIAL

## 2025-06-27 DIAGNOSIS — M92.61 HAGLUND'S DEFORMITY, RIGHT: ICD-10-CM

## 2025-06-27 DIAGNOSIS — M79.671 PAIN OF RIGHT HEEL: Primary | ICD-10-CM

## 2025-06-27 PROCEDURE — 97112 NEUROMUSCULAR REEDUCATION: CPT | Performed by: PHYSICAL THERAPIST

## 2025-06-27 PROCEDURE — 97530 THERAPEUTIC ACTIVITIES: CPT | Performed by: PHYSICAL THERAPIST

## 2025-06-27 NOTE — PROGRESS NOTES
"Daily Note     Today's date: 2025  Patient name: Mercy Sandy  : 2000  MRN: [id number]  Referring provider: Ovidio Kwon DPM  Dx:   Encounter Diagnosis     ICD-10-CM    1. Pain of right heel  M79.671       2. 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 He has been more active the past few days and is feeling more painful/sore.      Objective: See treatment diary below      Assessment: Minimal exercise progressions this date secondary to soreness at onset of session. Tolerated treatment well. Patient demonstrated fatigue post treatment, exhibited good technique with therapeutic exercises, and would benefit from continued PT      Plan: Progress treatment as tolerated.       Precautions: GERD, WBAT in supportive sneaker, (requested MD restrictions/protocol 25), WBAT. No DF beyond neutral, no resisted DF until 6weeks, AROM is okay )  CO-MORBIDITIES:  HEP ACCESS CODE:   FOTO Completed On: 25    POC Expires Reeval for Medicare to be completed Re-eval last completed on Unit Limit Auth Start Date Auth Expiration Date PT/OT/ST  Visit Limit   25 By visit  10  unknown NA NA 90 ppy                         TREATMENT DIARY  Auth Status DATE  (IE) 6-10 6-13 6-17 6-20 6-27       NA Visit # 1 2 3 4 5 6        Remaining 9 8 7 6 5 4       MANUAL THERAPY                                                                                           THERAPEUTIC EXERCISE             NuStep in to improve LE strength/endurance 10 mins, L4 NV 10 mins, L5 10 mins, L5 L5 10' 10 mins, L5 10 mins, L6                    Table:             - hip 4 way 2 x 10 ea  2 x 10 ea 2 x 10 ea 2x10 ea         - bridges with TB around knees   20 x 5\" Blue TB 20 x 5\" Blue TB 20x5''  Blue TB 20x5''  Blue TB 20x5''  Blue TB       - SL hip ER   20 x 5\" ea Blue TB 20 x 5\" ea Blue TB 20x5'' ea  Blue TB 20x5''  Blue TB 20x5''  Blue TB       - ankle TB 4 " "way     2 x 10 RTB 2 x 10 RTB                    Machine:             - leg extension   2 x 10, 20# 2 x 10, 20# 2x10 20#  2 x 10, 30# 2 x 10, 30#       - HS curls   2 x 10, 40# 2 x 10, 40# 2x10 40# 2  x10, 50# 2 x 10, 50#                                 NEUROMUSCULAR REEDUCATION              Seated (avoid DF > neutral):             - towel scrunches 2 mins x 5\" NV 2 mins x 5\" 2 mins x 5\" 2 mins x 5' 2 mins x 5' 2 mins x 5\" hold       - doming 2 mins x 5\" NV 2 mins x 5\" 2 mins x 5\" 2 mins x 5'' 2 mins x 5' 2 mins x 5\" hold       - [place] board (ML)     20 x ea 20 x ea                    Standing:             - TB hip 3 way      2 x 10 ea RTB 2 x 10 ea RTB                                 THERAPEUTIC ACTIVITY             Front step ups 2 x 10 6\" step with CAM boot NV 2 x 10, 6\" step  2 x 10, 6\" step  2x10 6''  step 2x10 8''  step 2x10 8''  step       Lateral step up 2 x 10 6\" step with CAM boot NV 2 x 10, 6\" step 2 x 10, 6\" step  2x10 6''  step 2x10 8''  step 2x10 8''  step       Leg press 2 x 10 50# with CAM boot NV 2 x 10 140# 2 x 10 140# 2 x 10 140# 2 x 10, 140# 2 x 10, 140# `                                                          GAIT TRAINING                                                                              MODALITIES                                               * 23 minutes 1:1 time this date.  "

## 2025-07-01 ENCOUNTER — OFFICE VISIT (OUTPATIENT)
Dept: PHYSICAL THERAPY | Facility: CLINIC | Age: 25
End: 2025-07-01
Payer: COMMERCIAL

## 2025-07-01 DIAGNOSIS — M92.61 HAGLUND'S DEFORMITY, RIGHT: Primary | ICD-10-CM

## 2025-07-01 DIAGNOSIS — M79.671 PAIN OF RIGHT HEEL: ICD-10-CM

## 2025-07-01 PROCEDURE — 97530 THERAPEUTIC ACTIVITIES: CPT | Performed by: PHYSICAL THERAPIST

## 2025-07-01 PROCEDURE — 97112 NEUROMUSCULAR REEDUCATION: CPT | Performed by: PHYSICAL THERAPIST

## 2025-07-01 PROCEDURE — 97110 THERAPEUTIC EXERCISES: CPT | Performed by: PHYSICAL THERAPIST

## 2025-07-01 NOTE — PROGRESS NOTES
"Daily Note     Today's date: 2025  Patient name: Mercy Sandy  : 2000  MRN: [id number]  Referring provider: Ovidio Kwon DPM  Dx:   Encounter Diagnosis     ICD-10-CM    1. 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 Pain of right heel  M79.671                      Subjective: Pt states he is very sore today as he did a lot of standing fishing the past two days.      Objective: See treatment diary below      Assessment: No exercise progressions this date secondary to continued soreness at onset of session. Tolerated treatment well. Patient demonstrated fatigue post treatment, exhibited good technique with therapeutic exercises, and would benefit from continued PT      Plan: Progress treatment as tolerated.       Precautions: GERD, WBAT in supportive sneaker, (requested MD restrictions/protocol 25), WBAT. No DF beyond neutral, no resisted DF until 6weeks, AROM is okay )  CO-MORBIDITIES:  HEP ACCESS CODE:   FOTO Completed On: 25    POC Expires Reeval for Medicare to be completed Re-eval last completed on Unit Limit Auth Start Date Auth Expiration Date PT/OT/ST  Visit Limit   25 By visit  10  unknown NA NA 90 ppy                         TREATMENT DIARY  Auth Status DATE  (IE) 6-10 6-13 6- 6 6 7-1      NA Visit # 1 2 3 4 5 6 7       Remaining 9 8 7 6 5 4 3      MANUAL THERAPY                                                                                           THERAPEUTIC EXERCISE             NuStep in to improve LE strength/endurance 10 mins, L4 NV 10 mins, L5 10 mins, L5 L5 10' 10 mins, L5 10 mins, L6 10 mins, L6                   Table:             - hip 4 way 2 x 10 ea  2 x 10 ea 2 x 10 ea 2x10 ea         - bridges with TB around knees   20 x 5\" Blue TB 20 x 5\" Blue TB 20x5''  Blue TB 20x5''  Blue TB 20x5''  Blue TB 20x5''  Blue TB      - SL hip ER   20 x 5\" ea Blue TB 20 x 5\" ea Blue TB 20x5'' ea  Blue TB 20x5''  Blue TB 20x5''  Blue TB 20x5''  Blue TB      - ankle TB " "4 way     2 x 10 RTB 2 x 10 RTB 2 x 10 RTB                   Machine:             - leg extension   2 x 10, 20# 2 x 10, 20# 2x10 20#  2 x 10, 30# 2 x 10, 30# 2 x 10, 30#      - HS curls   2 x 10, 40# 2 x 10, 40# 2x10 40# 2  x10, 50# 2 x 10, 50# 2 x 10, 60#                                NEUROMUSCULAR REEDUCATION              Seated (avoid DF > neutral):             - towel scrunches 2 mins x 5\" NV 2 mins x 5\" 2 mins x 5\" 2 mins x 5' 2 mins x 5' 2 mins x 5\" hold 2 mins x 5\" hold      - doming 2 mins x 5\" NV 2 mins x 5\" 2 mins x 5\" 2 mins x 5'' 2 mins x 5' 2 mins x 5\" hold 2 mins x 5\" hold      - [place] board (ML)     20 x ea 20 x ea 20 x ea                   Standing:             - TB hip 3 way      2 x 10 ea RTB 2 x 10 ea RTB 2 x 10 ea RTB                                THERAPEUTIC ACTIVITY             Front step ups 2 x 10 6\" step with CAM boot NV 2 x 10, 6\" step  2 x 10, 6\" step  2x10 6''  step 2x10 8''  step 2x10 8''  step 2x10 8''  step      Lateral step up 2 x 10 6\" step with CAM boot NV 2 x 10, 6\" step 2 x 10, 6\" step  2x10 6''  step 2x10 8''  step 2x10 8''  step 2x10 8''  step      Leg press 2 x 10 50# with CAM boot NV 2 x 10 140# 2 x 10 140# 2 x 10 140# 2 x 10, 140# 2 x 10, 140# `2 x 10, 140#                                                          GAIT TRAINING                                                                              MODALITIES                                                 * 38 minutes 1:1 time this date.  "

## 2025-07-02 NOTE — PROGRESS NOTES
"Daily Note     Today's date: 7/3/2025  Patient name: Mercy Sandy  : 2000  MRN: [id number]  Referring provider: Ovidio Kwon DPM  Dx:   Encounter Diagnosis     ICD-10-CM    1. 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 Pain of right heel  M79.671                      Subjective: The patient states that he is sore today, has been busy at home and on his feet a lot.  He reports that the last of his scabs fell off, incision has been dry and clean.            Objective: See treatment diary below      Assessment: Tolerated treatment well.  He had pain along his heel and front of ankle with lateral step ups, no complaints with other TE today.  Patient decreased weight for hams curls to 50# 2* fatigue at 60# last session.  Patient demonstrated fatigue post treatment and would benefit from continued PT.        Plan: Continue per plan of care.      Precautions: GERD, WBAT in supportive sneaker, (requested MD restrictions/protocol 25), WBAT. No DF beyond neutral, no resisted DF until 6weeks, AROM is okay )  CO-MORBIDITIES:  HEP ACCESS CODE:   FOTO Completed On: 25    POC Expires Reeval for Medicare to be completed Re-eval last completed on Unit Limit Auth Start Date Auth Expiration Date PT/OT/ST  Visit Limit   25 By visit  10  unknown NA NA 90 ppy                         TREATMENT DIARY  Auth Status DATE  (IE) 6-10 6-13 6-17 6-20 6 7-1 7/3     NA Visit # 1 2 3 4 5 6 7 8      Remaining 9 8 7 6 5 4 3 2     MANUAL THERAPY                                                                                           THERAPEUTIC EXERCISE             NuStep in to improve LE strength/endurance 10 mins, L4 NV 10 mins, L5 10 mins, L5 L5 10' 10 mins, L5 10 mins, L6 10 mins, L6 L6 10 min                  Table:             - hip 4 way 2 x 10 ea  2 x 10 ea 2 x 10 ea 2x10 ea         - bridges with TB around knees   20 x 5\" Blue TB 20 x 5\" Blue TB 20x5''  Blue TB 20x5''  Blue TB 20x5''  Blue TB 20x5''  Blue " "TB 5\"x20  Blue TB     - SL hip ER   20 x 5\" ea Blue TB 20 x 5\" ea Blue TB 20x5'' ea  Blue TB 20x5''  Blue TB 20x5''  Blue TB 20x5''  Blue TB 5\"x20  Blue TB     - ankle TB 4 way     2 x 10 RTB 2 x 10 RTB 2 x 10 RTB 2x10 RTB                  Machine:             - leg extension   2 x 10, 20# 2 x 10, 20# 2x10 20#  2 x 10, 30# 2 x 10, 30# 2 x 10, 30# 30# 2x10     - HS curls   2 x 10, 40# 2 x 10, 40# 2x10 40# 2  x10, 50# 2 x 10, 50# 2 x 10, 60# 50# 2x10                               NEUROMUSCULAR REEDUCATION              Seated (avoid DF > neutral):             - towel scrunches 2 mins x 5\" NV 2 mins x 5\" 2 mins x 5\" 2 mins x 5' 2 mins x 5' 2 mins x 5\" hold 2 mins x 5\" hold 5\" 2 mins     - doming 2 mins x 5\" NV 2 mins x 5\" 2 mins x 5\" 2 mins x 5'' 2 mins x 5' 2 mins x 5\" hold 2 mins x 5\" hold 5\" 2 mins     - [place] board (ML)     20 x ea 20 x ea 20 x ea 20x ea                  Standing:             - TB hip 3 way      2 x 10 ea RTB 2 x 10 ea RTB 2 x 10 ea RTB 2x10 ea  GTB                               THERAPEUTIC ACTIVITY             Front step ups 2 x 10 6\" step with CAM boot NV 2 x 10, 6\" step  2 x 10, 6\" step  2x10 6''  step 2x10 8''  step 2x10 8''  step 2x10 8''  step 8\" step  2x10     Lateral step up 2 x 10 6\" step with CAM boot NV 2 x 10, 6\" step 2 x 10, 6\" step  2x10 6''  step 2x10 8''  step 2x10 8''  step 2x10 8''  step 8\" step   2x10     Leg press 2 x 10 50# with CAM boot NV 2 x 10 140# 2 x 10 140# 2 x 10 140# 2 x 10, 140# 2 x 10, 140# 2 x 10, 140# 140# 2x10                                                         GAIT TRAINING                                                                              MODALITIES                                                    "

## 2025-07-03 ENCOUNTER — OFFICE VISIT (OUTPATIENT)
Dept: PHYSICAL THERAPY | Facility: CLINIC | Age: 25
End: 2025-07-03
Payer: COMMERCIAL

## 2025-07-03 DIAGNOSIS — M79.671 PAIN OF RIGHT HEEL: ICD-10-CM

## 2025-07-03 DIAGNOSIS — M92.61 HAGLUND'S DEFORMITY, RIGHT: Primary | ICD-10-CM

## 2025-07-03 PROCEDURE — 97112 NEUROMUSCULAR REEDUCATION: CPT | Performed by: PHYSICAL THERAPIST

## 2025-07-03 PROCEDURE — 97530 THERAPEUTIC ACTIVITIES: CPT | Performed by: PHYSICAL THERAPIST

## 2025-07-03 PROCEDURE — 97110 THERAPEUTIC EXERCISES: CPT | Performed by: PHYSICAL THERAPIST

## 2025-07-08 ENCOUNTER — APPOINTMENT (OUTPATIENT)
Dept: PHYSICAL THERAPY | Facility: CLINIC | Age: 25
End: 2025-07-08
Payer: COMMERCIAL

## 2025-07-11 ENCOUNTER — OFFICE VISIT (OUTPATIENT)
Dept: PHYSICAL THERAPY | Facility: CLINIC | Age: 25
End: 2025-07-11
Payer: COMMERCIAL

## 2025-07-11 ENCOUNTER — TELEPHONE (OUTPATIENT)
Age: 25
End: 2025-07-11

## 2025-07-11 DIAGNOSIS — M79.671 PAIN OF RIGHT HEEL: Primary | ICD-10-CM

## 2025-07-11 DIAGNOSIS — M92.61 HAGLUND'S DEFORMITY, RIGHT: ICD-10-CM

## 2025-07-11 PROCEDURE — 97110 THERAPEUTIC EXERCISES: CPT | Performed by: PHYSICAL THERAPIST

## 2025-07-11 PROCEDURE — 97112 NEUROMUSCULAR REEDUCATION: CPT | Performed by: PHYSICAL THERAPIST

## 2025-07-11 NOTE — TELEPHONE ENCOUNTER
Patient called faxed his 6/9 chart note to his [email or web address] for payroll at  [phone] per his request

## 2025-07-11 NOTE — PROGRESS NOTES
Daily Note     Today's date: 2025  Patient name: Mercy Sandy  : 2000  MRN: [id number]  Referring provider: Ovidio Kwon DPM  Dx:   Encounter Diagnosis     ICD-10-CM    1. Pain of right heel  M79.671       2. 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 He denies any injury, rather, states he has been laying around most of the week ill with food poisoning.      Objective: See treatment diary below      Assessment: Pt does present with a small bump proximal to achilles insertion which is quite tender to palpation. He has a negative Hammond's test upon exam. Performed a limited amount of exercises this date secondary to pain levels and subjective report. Educated pt to call podiatrist and try to get in to be seen secondary to recent significant flare of pain. Tolerated treatment fair. Patient demonstrated fatigue post treatment and would benefit from continued PT      Plan: Progress treatment as tolerated.       Precautions: GERD, WBAT in supportive sneaker, (requested MD restrictions/protocol 25), WBAT. No DF beyond neutral, no resisted DF until 6weeks, AROM is okay )  CO-MORBIDITIES:  HEP ACCESS CODE:   FOTO Completed On: 25    POC Expires Reeval for Medicare to be completed Re-eval last completed on Unit Limit Auth Start Date Auth Expiration Date PT/OT/ST  Visit Limit   25 By visit  10  unknown NA NA 90 ppy                         TREATMENT DIARY  Auth Status DATE  (IE) 610 6-13 617 6 6 7-1 7/3 7-11    NA Visit # 1 2 3 4 5 6 7 8 9     Remaining 9 8 7 6 5 4 3 2 1    MANUAL THERAPY                                                                                           THERAPEUTIC EXERCISE             NuStep in to improve LE strength/endurance 10 mins, L4 NV 10 mins, L5 10 mins, L5 L5 10' 10 mins, L5 10 mins,  "L6 10 mins, L6 L6 10 min 10 mins, L6                 Table:             - hip 4 way 2 x 10 ea  2 x 10 ea 2 x 10 ea 2x10 ea         - bridges with TB around knees   20 x 5\" Blue TB 20 x 5\" Blue TB 20x5''  Blue TB 20x5''  Blue TB 20x5''  Blue TB 20x5''  Blue TB 5\"x20  Blue TB 5\"x20  Blue TB    - SL hip ER   20 x 5\" ea Blue TB 20 x 5\" ea Blue TB 20x5'' ea  Blue TB 20x5''  Blue TB 20x5''  Blue TB 20x5''  Blue TB 5\"x20  Blue TB 5\"x20  Blue TB    - ankle TB 4 way     2 x 10 RTB 2 x 10 RTB 2 x 10 RTB 2x10 RTB                  Machine:             - leg extension   2 x 10, 20# 2 x 10, 20# 2x10 20#  2 x 10, 30# 2 x 10, 30# 2 x 10, 30# 30# 2x10 30# 2x10    - HS curls   2 x 10, 40# 2 x 10, 40# 2x10 40# 2  x10, 50# 2 x 10, 50# 2 x 10, 60# 50# 2x10 50# 2x10                              NEUROMUSCULAR REEDUCATION              Seated (avoid DF > neutral):             - towel scrunches 2 mins x 5\" NV 2 mins x 5\" 2 mins x 5\" 2 mins x 5' 2 mins x 5' 2 mins x 5\" hold 2 mins x 5\" hold 5\" 2 mins 2 mins x 5\"    - doming 2 mins x 5\" NV 2 mins x 5\" 2 mins x 5\" 2 mins x 5'' 2 mins x 5' 2 mins x 5\" hold 2 mins x 5\" hold 5\" 2 mins 2 mins x 5\"    - [place] board (ML)     20 x ea 20 x ea 20 x ea 20x ea held                 Standing:             - TB hip 3 way      2 x 10 ea RTB 2 x 10 ea RTB 2 x 10 ea RTB 2x10 ea  GTB held                              THERAPEUTIC ACTIVITY             Front step ups 2 x 10 6\" step with CAM boot NV 2 x 10, 6\" step  2 x 10, 6\" step  2x10 6''  step 2x10 8''  step 2x10 8''  step 2x10 8''  step 8\" step  2x10 held    Lateral step up 2 x 10 6\" step with CAM boot NV 2 x 10, 6\" step 2 x 10, 6\" step  2x10 6''  step 2x10 8''  step 2x10 8''  step 2x10 8''  step 8\" step   2x10 held    Leg press 2 x 10 50# with CAM boot NV 2 x 10 140# 2 x 10 140# 2 x 10 140# 2 x 10, 140# 2 x 10, 140# 2 x 10, 140# 140# 2x10 held                                                        GAIT TRAINING                                                  "                             MODALITIES             Cryo to achilles and gastroc in prone         10 mins

## 2025-07-15 ENCOUNTER — EVALUATION (OUTPATIENT)
Dept: PHYSICAL THERAPY | Facility: CLINIC | Age: 25
End: 2025-07-15
Payer: COMMERCIAL

## 2025-07-15 DIAGNOSIS — M92.61 HAGLUND'S DEFORMITY, RIGHT: Primary | ICD-10-CM

## 2025-07-15 DIAGNOSIS — M79.671 PAIN OF RIGHT HEEL: ICD-10-CM

## 2025-07-15 PROCEDURE — 97110 THERAPEUTIC EXERCISES: CPT | Performed by: PHYSICAL THERAPIST

## 2025-07-15 PROCEDURE — 97112 NEUROMUSCULAR REEDUCATION: CPT | Performed by: PHYSICAL THERAPIST

## 2025-07-17 ENCOUNTER — OFFICE VISIT (OUTPATIENT)
Age: 25
End: 2025-07-17

## 2025-07-17 VITALS — BODY MASS INDEX: 30.08 KG/M2 | WEIGHT: 260 LBS | HEIGHT: 78 IN

## 2025-07-17 DIAGNOSIS — M79.671 RIGHT FOOT PAIN: ICD-10-CM

## 2025-07-17 DIAGNOSIS — Z98.890 POST-OPERATIVE STATE: Primary | ICD-10-CM

## 2025-07-17 PROCEDURE — 99024 POSTOP FOLLOW-UP VISIT: CPT

## 2025-07-18 ENCOUNTER — APPOINTMENT (OUTPATIENT)
Dept: PHYSICAL THERAPY | Facility: CLINIC | Age: 25
End: 2025-07-18
Payer: COMMERCIAL

## 2025-07-18 NOTE — PROGRESS NOTES
Name: Mercy Sandy      : 2000      MRN: [id number]  Encounter Provider: Ovidio Kwon DPM  Encounter Date: 2025   Encounter department: [facility]    Assessment & Plan     1. Post-operative state  -     XR ankle 3+ vw right; Future; Expected date: 2025  2. Right foot pain  -     XR foot 3+ vw right; Future; Expected date: 2025          Weightbearing as tolerated in supportive sneaker.  Recommend 1 week off from physical therapy with limited weightbearing, can return to CAM boot if that helps relieve symptoms.  Continue formal physical therapy in 1 week from today.   Patient okay to place moisturizing lotions around the surgical incision.  Patient wash the area with antibacterial soap and water.  I do not recommend soaking of the foot or ankle.    Notify the office immediately of any signs of infection including but not limited to: redness around incision, streaking red line up foot or leg, drainage, increased pain, fever, chills, sweats, nausea, vomiting, shortness of breath and/or chest pain.  If you are concerned for any reason, we have a physician on call 24 hours a day 7 days a week that can answer your questions.  Please call [phone]    Subjective      Mercy presents approximately 11 weeks status post right Indira's deformity correction.  Patient states that he has been going to physical therapy, but recently has been having increased pain at incision site worsening over the last 2-3 weeks.  He has been weightbearing as tolerated in a supportive sneaker.  He denies any recent injury to surgical foot.      DOS 2025      Review of Systems      Constitutional: Negative for fever or chills.   Respiratory: Negative for shortness of breath.    Cardiovascular: Negative for chest pain.  Gastrointestinal: Negative for nausea and vomiting.   Musculoskeletal: No calf pain    Current Outpatient Medications on File Prior to Visit   Medication  "Sig    aspirin 325 mg tablet Take 1 tablet (325 mg total) by mouth daily (Patient not taking: Reported on 5/19/2025)    famotidine (PEPCID) 20 mg tablet Take 20 mg by mouth in the morning. (Patient not taking: Reported on 7/17/2025)    loratadine (CLARITIN) 10 mg tablet Take 10 mg by mouth in the morning. (Patient not taking: Reported on 7/17/2025)       Objective     Ht 6' 6\" (1.981 m)   Wt 118 kg (260 lb)   BMI 30.05 kg/m²     Physical Exam    Incision site appears well healing, without any warmth or redness.  There are no signs of necrosis.  There is some expected swelling.  There is pain on palpation at Achilles tendon insertion, no palpable evidence of hardware backing out.  Hammond test negative. no calf pain or tenderness on palpation.  Neurological status is at baseline.  Vascular status is at baseline.  "

## 2025-07-22 ENCOUNTER — APPOINTMENT (OUTPATIENT)
Dept: PHYSICAL THERAPY | Facility: CLINIC | Age: 25
End: 2025-07-22
Payer: COMMERCIAL

## 2025-07-25 ENCOUNTER — APPOINTMENT (OUTPATIENT)
Dept: PHYSICAL THERAPY | Facility: CLINIC | Age: 25
End: 2025-07-25
Payer: COMMERCIAL

## 2025-07-29 ENCOUNTER — APPOINTMENT (OUTPATIENT)
Dept: PHYSICAL THERAPY | Facility: CLINIC | Age: 25
End: 2025-07-29
Payer: COMMERCIAL

## 2025-08-05 ENCOUNTER — OFFICE VISIT (OUTPATIENT)
Dept: PHYSICAL THERAPY | Facility: CLINIC | Age: 25
End: 2025-08-05
Payer: COMMERCIAL

## 2025-08-05 DIAGNOSIS — M79.671 PAIN OF RIGHT HEEL: ICD-10-CM

## 2025-08-05 DIAGNOSIS — M92.61 HAGLUND'S DEFORMITY, RIGHT: Primary | ICD-10-CM

## 2025-08-05 PROCEDURE — 97530 THERAPEUTIC ACTIVITIES: CPT | Performed by: PHYSICAL THERAPIST

## 2025-08-05 PROCEDURE — 97112 NEUROMUSCULAR REEDUCATION: CPT | Performed by: PHYSICAL THERAPIST

## 2025-08-05 PROCEDURE — 97110 THERAPEUTIC EXERCISES: CPT | Performed by: PHYSICAL THERAPIST

## 2025-08-08 ENCOUNTER — OFFICE VISIT (OUTPATIENT)
Dept: PHYSICAL THERAPY | Facility: CLINIC | Age: 25
End: 2025-08-08
Payer: COMMERCIAL

## 2025-08-08 DIAGNOSIS — M92.61 HAGLUND'S DEFORMITY, RIGHT: Primary | ICD-10-CM

## 2025-08-08 DIAGNOSIS — M79.671 PAIN OF RIGHT HEEL: ICD-10-CM

## 2025-08-08 PROCEDURE — 97112 NEUROMUSCULAR REEDUCATION: CPT | Performed by: PHYSICAL THERAPIST

## 2025-08-08 PROCEDURE — 97110 THERAPEUTIC EXERCISES: CPT | Performed by: PHYSICAL THERAPIST

## 2025-08-12 ENCOUNTER — OFFICE VISIT (OUTPATIENT)
Dept: PHYSICAL THERAPY | Facility: CLINIC | Age: 25
End: 2025-08-12
Payer: COMMERCIAL

## 2025-08-15 ENCOUNTER — OFFICE VISIT (OUTPATIENT)
Dept: PHYSICAL THERAPY | Facility: CLINIC | Age: 25
End: 2025-08-15
Payer: COMMERCIAL

## 2025-08-18 ENCOUNTER — OFFICE VISIT (OUTPATIENT)
Age: 25
End: 2025-08-18
Payer: COMMERCIAL

## 2025-08-18 VITALS — HEIGHT: 78 IN | BODY MASS INDEX: 30.08 KG/M2 | WEIGHT: 260 LBS

## 2025-08-18 DIAGNOSIS — M76.60 ACHILLES TENDON PAIN: ICD-10-CM

## 2025-08-18 DIAGNOSIS — M92.61 HAGLUND'S DEFORMITY OF RIGHT HEEL: Primary | ICD-10-CM

## 2025-08-18 DIAGNOSIS — Z98.890 POST-OPERATIVE STATE: ICD-10-CM

## 2025-08-18 PROCEDURE — 99213 OFFICE O/P EST LOW 20 MIN: CPT

## 2025-08-19 ENCOUNTER — OFFICE VISIT (OUTPATIENT)
Dept: PHYSICAL THERAPY | Facility: CLINIC | Age: 25
End: 2025-08-19
Payer: COMMERCIAL

## 2025-08-19 DIAGNOSIS — M79.671 PAIN OF RIGHT HEEL: Primary | ICD-10-CM

## 2025-08-19 DIAGNOSIS — M92.61 HAGLUND'S DEFORMITY, RIGHT: ICD-10-CM

## 2025-08-19 PROCEDURE — 97112 NEUROMUSCULAR REEDUCATION: CPT

## 2025-08-19 PROCEDURE — 97110 THERAPEUTIC EXERCISES: CPT

## 2025-08-19 PROCEDURE — 97530 THERAPEUTIC ACTIVITIES: CPT

## 2025-08-22 ENCOUNTER — OFFICE VISIT (OUTPATIENT)
Dept: PHYSICAL THERAPY | Facility: CLINIC | Age: 25
End: 2025-08-22
Payer: COMMERCIAL

## 2025-08-22 DIAGNOSIS — M92.61 HAGLUND'S DEFORMITY, RIGHT: ICD-10-CM

## 2025-08-22 DIAGNOSIS — M79.671 PAIN OF RIGHT HEEL: Primary | ICD-10-CM

## 2025-08-22 PROCEDURE — 97112 NEUROMUSCULAR REEDUCATION: CPT | Performed by: PHYSICAL THERAPIST

## 2025-08-22 PROCEDURE — 97110 THERAPEUTIC EXERCISES: CPT | Performed by: PHYSICAL THERAPIST

## 2025-08-22 PROCEDURE — 97530 THERAPEUTIC ACTIVITIES: CPT | Performed by: PHYSICAL THERAPIST

## (undated) DEVICE — PADDING CAST 4 IN  COTTON STRL

## (undated) DEVICE — FRAZIER SUCTION INSTRUMENT 18 FR W/OBTURATOR, NO CONTROL VENT: Brand: FRAZIER

## (undated) DEVICE — NEPTUNE E-SEP SMOKE EVACUATION PENCIL, COATED, 70MM BLADE, PUSH BUTTON SWITCH: Brand: NEPTUNE E-SEP

## (undated) DEVICE — CURITY STRETCH BANDAGE: Brand: CURITY

## (undated) DEVICE — BLADE SAW 31 X 9 MM

## (undated) DEVICE — SUT ETHILON 3-0 INFS-1 30 IN 669H

## (undated) DEVICE — ANTIBACTERIAL UNDYED BRAIDED (POLYGLACTIN 910), SYNTHETIC ABSORBABLE SUTURE: Brand: COATED VICRYL

## (undated) DEVICE — DRAPE C-ARMOUR

## (undated) DEVICE — GLOVE SRG BIOGEL 6.5

## (undated) DEVICE — GLOVE INDICATOR PI UNDERGLOVE SZ 7 BLUE

## (undated) DEVICE — C-ARM: Brand: UNBRANDED

## (undated) DEVICE — INTENDED FOR TISSUE SEPARATION, AND OTHER PROCEDURES THAT REQUIRE A SHARP SURGICAL BLADE TO PUNCTURE OR CUT.: Brand: BARD-PARKER ® CARBON RIB-BACK BLADES

## (undated) DEVICE — GAUZE SPONGES,16 PLY: Brand: CURITY

## (undated) DEVICE — SPLINT COMFORT 4 X 30

## (undated) DEVICE — DISPOSABLE OR TOWEL: Brand: CARDINAL HEALTH

## (undated) DEVICE — CHLORAPREP HI-LITE 26ML ORANGE

## (undated) DEVICE — STOCKINETTE: Brand: DEROYAL

## (undated) DEVICE — HALF SHEET: Brand: CONVERTORS

## (undated) DEVICE — CYSTO TUBING SINGLE IRRIGATION

## (undated) DEVICE — ACE WRAP 4 IN UNSTERILE